# Patient Record
Sex: FEMALE | Race: AMERICAN INDIAN OR ALASKA NATIVE | Employment: UNEMPLOYED | URBAN - METROPOLITAN AREA
[De-identification: names, ages, dates, MRNs, and addresses within clinical notes are randomized per-mention and may not be internally consistent; named-entity substitution may affect disease eponyms.]

---

## 2019-11-01 ENCOUNTER — TRANSFERRED RECORDS (OUTPATIENT)
Dept: HEALTH INFORMATION MANAGEMENT | Facility: CLINIC | Age: 44
End: 2019-11-01

## 2019-11-01 LAB
HEP C HIM: ABNORMAL
TSH SERPL-ACNC: 0.36 UIU/ML (ref 0.49–4.67)

## 2019-11-06 NOTE — PROGRESS NOTES
[facility]  Transfer Triage Note    Date of call: 11/5/2019  Time of call: 4PM    Reason for Transfer: Hepatology consultation  Diagnosis: acute hepatitis in setting of cirrhosis    Outside Records: Unavailable at time of call    Stability of Patient: stable for transfer at time of call    Expected Time of Arrival for Transfer: less than 24 hours    Pt is a 43 y/o female with PMH of liver cirrhosis and EtOH dependence with current active EtOH use (reportedly 1L vodka/day PTA). Pt admitted to [facility] Nov 1st with abdominal pain, found to have AST and ALT in 1000s, Alk phos in the 200s, T bili of 14.5, INR 2.4, normal creatinine. GI at [facility] consulted, concern for acute EtOH hepatitis and Pt started on methylprednisolone with Maddrey score reported at 102. Since admission, LFTs not improving significantly and [facility] requesting transfer to [facility] for further evaluation with hepatology here. Today AST and ALT in 700s, alk phos 191, INR has increased to 3.2 and T bili has increased to 14.5. Creatinine today is 0.7 and Hgb 10.9. MRCP done at [facility] reportedly without obstruction or biliary dilatation seen, reportedly testing for acute infectious hepatitis done at [facility] as well and negative. Dr. Hawkins of hepatology was included in the phone call and recommended transfer to [facility] to evaluate for additional causes of acute hepatitis including consideration of possible liver biopsy pending clinical course due to concerns that this is not wholly consistent with solely acute EtOH hepatitis. Pt stable for transfer at time of my call with SBP in 90s, no AMS with regular lactulose use.     Pt accepted to Adult Med/Surg bed. If greater than 24 hours or if clinical status change, transferring provider was asked to call and provide a physician-to-physician update prior to transfer to ensure level of care remains appropriate.     Antonia Hanks MD  [phone]        Update 11/7 at 2:30 pm   Received an update from the hospitalist at [facility] regarding ms. Sanchez. She has remained hemodynamically stable, with stable vitals and no new concerning clinical changes such as altered mental status. Lab changes notable for increase in bili from 16.5 to 18.8, however LFTs downtrending (now 500s) and INR unchanged at 3. She has a newly positive hep C (negative 10/2018), other hepatitis serologies reportedly normal/negative. GI remains involved.     Shelly Dunbar MD   [phone]       Update 11/10 at 3 pm   Received an update from the hospitalist Dr. Downs at [facility], endorsing that Ms. Sanchez continues to have uptrending bilirubin (23 now), AST/ALT fluctuating in 400-500s and INR stabilized at 3. MELD of 31. They did an MRCP that was negative for obstruction, no new ascites or altered mental status. Dr. Segovia of hepatology on the call, remains concerned that her trend is not clearly all related to this newly positive hepatitis C and a liver biopsy may still be warranted, along with the hepatologist and pathologist expertise of [facility].   Patient accepted to med-surg bed without tele.     Shelly Dunbar MD   [phone]

## 2019-11-10 ENCOUNTER — HOSPITAL ENCOUNTER (INPATIENT)
Facility: CLINIC | Age: 44
LOS: 9 days | Discharge: HOME | DRG: 433 | End: 2019-11-19
Attending: INTERNAL MEDICINE | Admitting: INTERNAL MEDICINE
Payer: COMMERCIAL

## 2019-11-10 DIAGNOSIS — K76.82 HEPATIC ENCEPHALOPATHY (H): ICD-10-CM

## 2019-11-10 DIAGNOSIS — K21.9 GASTROESOPHAGEAL REFLUX DISEASE WITHOUT ESOPHAGITIS: ICD-10-CM

## 2019-11-10 DIAGNOSIS — K75.9 HEPATITIS: ICD-10-CM

## 2019-11-10 DIAGNOSIS — G89.4 CHRONIC PAIN SYNDROME: ICD-10-CM

## 2019-11-10 DIAGNOSIS — K70.31 ALCOHOLIC CIRRHOSIS OF LIVER WITH ASCITES (H): Primary | ICD-10-CM

## 2019-11-10 LAB
ALT SERPL-CCNC: 418 U/L (ref 7–55)
AST SERPL-CCNC: 493 U/L (ref 5–34)
CREAT SERPL-MCNC: 0.7 MG/DL (ref 0.6–1.3)
GLUCOSE SERPL-MCNC: 78 MG/DL (ref 70–99)
INR PPP: 3.1 (ref 0.9–1.2)
POTASSIUM SERPL-SCNC: 3.7 MMOL/L (ref 3.6–5.5)

## 2019-11-10 PROCEDURE — 99207 ZZC NO CHARGE LOS: CPT | Performed by: INTERNAL MEDICINE

## 2019-11-10 PROCEDURE — 25000132 ZZH RX MED GY IP 250 OP 250 PS 637: Performed by: NURSE PRACTITIONER

## 2019-11-10 PROCEDURE — 12000001 ZZH R&B MED SURG/OB UMMC

## 2019-11-10 PROCEDURE — 99207 ZZC APP CREDIT; MD BILLING SHARED VISIT: CPT | Performed by: NURSE PRACTITIONER

## 2019-11-10 PROCEDURE — 25800030 ZZH RX IP 258 OP 636: Performed by: NURSE PRACTITIONER

## 2019-11-10 PROCEDURE — 99222 1ST HOSP IP/OBS MODERATE 55: CPT | Performed by: INTERNAL MEDICINE

## 2019-11-10 RX ORDER — TRAZODONE HYDROCHLORIDE 50 MG/1
50 TABLET, FILM COATED ORAL AT BEDTIME
Status: DISCONTINUED | OUTPATIENT
Start: 2019-11-10 | End: 2019-11-10

## 2019-11-10 RX ORDER — ONDANSETRON 4 MG/1
4 TABLET, ORALLY DISINTEGRATING ORAL EVERY 6 HOURS PRN
Status: DISCONTINUED | OUTPATIENT
Start: 2019-11-10 | End: 2019-11-19 | Stop reason: HOSPADM

## 2019-11-10 RX ORDER — FAMOTIDINE 20 MG/1
40 TABLET, FILM COATED ORAL 2 TIMES DAILY
Status: DISCONTINUED | OUTPATIENT
Start: 2019-11-10 | End: 2019-11-10

## 2019-11-10 RX ORDER — GABAPENTIN 300 MG/1
300 CAPSULE ORAL AT BEDTIME
Status: DISCONTINUED | OUTPATIENT
Start: 2019-11-10 | End: 2019-11-19 | Stop reason: HOSPADM

## 2019-11-10 RX ORDER — PROCHLORPERAZINE 25 MG
25 SUPPOSITORY, RECTAL RECTAL EVERY 12 HOURS PRN
Status: DISCONTINUED | OUTPATIENT
Start: 2019-11-10 | End: 2019-11-19 | Stop reason: HOSPADM

## 2019-11-10 RX ORDER — ONDANSETRON 2 MG/ML
4 INJECTION INTRAMUSCULAR; INTRAVENOUS EVERY 6 HOURS PRN
Status: DISCONTINUED | OUTPATIENT
Start: 2019-11-10 | End: 2019-11-19 | Stop reason: HOSPADM

## 2019-11-10 RX ORDER — NALOXONE HYDROCHLORIDE 0.4 MG/ML
.1-.4 INJECTION, SOLUTION INTRAMUSCULAR; INTRAVENOUS; SUBCUTANEOUS
Status: DISCONTINUED | OUTPATIENT
Start: 2019-11-10 | End: 2019-11-19 | Stop reason: HOSPADM

## 2019-11-10 RX ORDER — GABAPENTIN 300 MG/1
300-600 CAPSULE ORAL 3 TIMES DAILY
Status: ON HOLD | COMMUNITY
End: 2019-11-12

## 2019-11-10 RX ORDER — FUROSEMIDE 40 MG
40 TABLET ORAL DAILY
Status: ON HOLD | COMMUNITY
End: 2019-11-12

## 2019-11-10 RX ORDER — FAMOTIDINE 40 MG/1
40 TABLET, FILM COATED ORAL DAILY
Status: ON HOLD | COMMUNITY
End: 2019-11-12

## 2019-11-10 RX ORDER — LIDOCAINE 40 MG/G
CREAM TOPICAL
Status: DISCONTINUED | OUTPATIENT
Start: 2019-11-10 | End: 2019-11-19 | Stop reason: HOSPADM

## 2019-11-10 RX ORDER — FUROSEMIDE 40 MG
40 TABLET ORAL DAILY
Status: DISCONTINUED | OUTPATIENT
Start: 2019-11-11 | End: 2019-11-19 | Stop reason: HOSPADM

## 2019-11-10 RX ORDER — SPIRONOLACTONE 50 MG/1
50 TABLET, FILM COATED ORAL DAILY
Status: ON HOLD | COMMUNITY
End: 2019-11-12

## 2019-11-10 RX ORDER — HYDROMORPHONE HYDROCHLORIDE 2 MG/1
2-4 TABLET ORAL
Status: DISCONTINUED | OUTPATIENT
Start: 2019-11-10 | End: 2019-11-19 | Stop reason: HOSPADM

## 2019-11-10 RX ORDER — MULTIVITAMIN,THERAPEUTIC
1 TABLET ORAL
Status: ON HOLD | COMMUNITY
End: 2019-11-12

## 2019-11-10 RX ORDER — FAMOTIDINE 20 MG/1
20 TABLET, FILM COATED ORAL 2 TIMES DAILY
Status: DISCONTINUED | OUTPATIENT
Start: 2019-11-11 | End: 2019-11-13

## 2019-11-10 RX ORDER — PROCHLORPERAZINE MALEATE 10 MG
10 TABLET ORAL EVERY 6 HOURS PRN
Status: DISCONTINUED | OUTPATIENT
Start: 2019-11-10 | End: 2019-11-19 | Stop reason: HOSPADM

## 2019-11-10 RX ORDER — MULTIVITAMIN,THERAPEUTIC
1 TABLET ORAL DAILY
Status: DISCONTINUED | OUTPATIENT
Start: 2019-11-11 | End: 2019-11-19 | Stop reason: HOSPADM

## 2019-11-10 RX ORDER — GABAPENTIN 300 MG/1
300 CAPSULE ORAL 3 TIMES DAILY
Status: DISCONTINUED | OUTPATIENT
Start: 2019-11-10 | End: 2019-11-19 | Stop reason: HOSPADM

## 2019-11-10 RX ORDER — AMOXICILLIN 250 MG
2 CAPSULE ORAL 2 TIMES DAILY PRN
Status: DISCONTINUED | OUTPATIENT
Start: 2019-11-10 | End: 2019-11-19 | Stop reason: HOSPADM

## 2019-11-10 RX ORDER — LACTULOSE 10 G/15ML
30 SOLUTION ORAL 2 TIMES DAILY
Status: DISCONTINUED | OUTPATIENT
Start: 2019-11-10 | End: 2019-11-13

## 2019-11-10 RX ORDER — AMOXICILLIN 250 MG
1 CAPSULE ORAL 2 TIMES DAILY PRN
Status: DISCONTINUED | OUTPATIENT
Start: 2019-11-10 | End: 2019-11-19 | Stop reason: HOSPADM

## 2019-11-10 RX ORDER — SPIRONOLACTONE 50 MG/1
50 TABLET, FILM COATED ORAL DAILY
Status: DISCONTINUED | OUTPATIENT
Start: 2019-11-11 | End: 2019-11-19 | Stop reason: HOSPADM

## 2019-11-10 RX ORDER — LACTULOSE 10 G/15ML
30 SOLUTION ORAL
Status: ON HOLD | COMMUNITY
Start: 2019-11-10 | End: 2019-11-11

## 2019-11-10 RX ORDER — NALOXONE HYDROCHLORIDE 0.4 MG/ML
.1-.4 INJECTION, SOLUTION INTRAMUSCULAR; INTRAVENOUS; SUBCUTANEOUS
Status: DISCONTINUED | OUTPATIENT
Start: 2019-11-10 | End: 2019-11-11

## 2019-11-10 RX ADMIN — Medication 1 MG: at 22:19

## 2019-11-10 RX ADMIN — Medication 25 MG: at 22:34

## 2019-11-10 RX ADMIN — HYDROMORPHONE HYDROCHLORIDE 4 MG: 2 TABLET ORAL at 21:26

## 2019-11-10 RX ADMIN — GABAPENTIN 300 MG: 300 CAPSULE ORAL at 22:20

## 2019-11-10 RX ADMIN — SODIUM CHLORIDE 500 ML: 9 INJECTION, SOLUTION INTRAVENOUS at 22:20

## 2019-11-10 RX ADMIN — GABAPENTIN 300 MG: 300 CAPSULE ORAL at 22:19

## 2019-11-10 RX ADMIN — LACTULOSE 30 ML: 20 SOLUTION ORAL at 22:19

## 2019-11-10 ASSESSMENT — ACTIVITIES OF DAILY LIVING (ADL)
DRESS: 0-->INDEPENDENT
TOILETING: 0-->INDEPENDENT
AMBULATION: 0-->INDEPENDENT
FALL_HISTORY_WITHIN_LAST_SIX_MONTHS: NO
TRANSFERRING: 0-->INDEPENDENT
BATHING: 0-->INDEPENDENT
RETIRED_EATING: 0-->INDEPENDENT
SWALLOWING: 0-->SWALLOWS FOODS/LIQUIDS WITHOUT DIFFICULTY
COGNITION: 0 - NO COGNITION ISSUES REPORTED
RETIRED_COMMUNICATION: 0-->UNDERSTANDS/COMMUNICATES WITHOUT DIFFICULTY

## 2019-11-10 ASSESSMENT — MIFFLIN-ST. JEOR: SCORE: 1575.76

## 2019-11-10 NOTE — LETTER
[address] [facility]  [address] [address]  [phone]    2019    Re: Jessie Sanchez  [address]   [place] [id number] [phone] (home)     : 1975      To Whom It May Concern:      Jessie Sanchez was hospitalized from 11/10/2019 until 2019 due to medical illness.  She may return to work when cleared at outpatient follow up in approximately 1-2 weeks      Sincerely,        Viral Hernandez MD

## 2019-11-11 ENCOUNTER — APPOINTMENT (OUTPATIENT)
Dept: ULTRASOUND IMAGING | Facility: CLINIC | Age: 44
DRG: 433 | End: 2019-11-11
Attending: NURSE PRACTITIONER
Payer: COMMERCIAL

## 2019-11-11 LAB
ALBUMIN SERPL-MCNC: 1.8 G/DL (ref 3.4–5)
ALP SERPL-CCNC: 205 U/L (ref 40–150)
ALT SERPL W P-5'-P-CCNC: 440 U/L (ref 0–50)
ANION GAP SERPL CALCULATED.3IONS-SCNC: 8 MMOL/L (ref 3–14)
AST SERPL W P-5'-P-CCNC: 490 U/L (ref 0–45)
BILIRUB SERPL-MCNC: 22.1 MG/DL (ref 0.2–1.3)
BUN SERPL-MCNC: 12 MG/DL (ref 7–30)
CALCIUM SERPL-MCNC: 8.2 MG/DL (ref 8.5–10.1)
CHLORIDE SERPL-SCNC: 96 MMOL/L (ref 94–109)
CO2 SERPL-SCNC: 25 MMOL/L (ref 20–32)
CREAT SERPL-MCNC: 1 MG/DL (ref 0.52–1.04)
ERYTHROCYTE [DISTWIDTH] IN BLOOD BY AUTOMATED COUNT: 22 % (ref 10–15)
GFR SERPL CREATININE-BSD FRML MDRD: 68 ML/MIN/{1.73_M2}
GLUCOSE SERPL-MCNC: 90 MG/DL (ref 70–99)
HCT VFR BLD AUTO: 35.8 % (ref 35–47)
HGB BLD-MCNC: 11.8 G/DL (ref 11.7–15.7)
INR PPP: 2.86 (ref 0.86–1.14)
MCH RBC QN AUTO: 27.1 PG (ref 26.5–33)
MCHC RBC AUTO-ENTMCNC: 33 G/DL (ref 31.5–36.5)
MCV RBC AUTO: 82 FL (ref 78–100)
PLATELET # BLD AUTO: 111 10E9/L (ref 150–450)
POTASSIUM SERPL-SCNC: 3.5 MMOL/L (ref 3.4–5.3)
PROT SERPL-MCNC: 5.4 G/DL (ref 6.8–8.8)
RBC # BLD AUTO: 4.36 10E12/L (ref 3.8–5.2)
SODIUM SERPL-SCNC: 129 MMOL/L (ref 133–144)
WBC # BLD AUTO: 13.6 10E9/L (ref 4–11)

## 2019-11-11 PROCEDURE — 87798 DETECT AGENT NOS DNA AMP: CPT | Performed by: NURSE PRACTITIONER

## 2019-11-11 PROCEDURE — 85027 COMPLETE CBC AUTOMATED: CPT | Performed by: NURSE PRACTITIONER

## 2019-11-11 PROCEDURE — 86803 HEPATITIS C AB TEST: CPT | Performed by: INTERNAL MEDICINE

## 2019-11-11 PROCEDURE — 12000001 ZZH R&B MED SURG/OB UMMC

## 2019-11-11 PROCEDURE — 25000132 ZZH RX MED GY IP 250 OP 250 PS 637: Performed by: NURSE PRACTITIONER

## 2019-11-11 PROCEDURE — 36415 COLL VENOUS BLD VENIPUNCTURE: CPT | Performed by: NURSE PRACTITIONER

## 2019-11-11 PROCEDURE — 36415 COLL VENOUS BLD VENIPUNCTURE: CPT | Performed by: INTERNAL MEDICINE

## 2019-11-11 PROCEDURE — 80053 COMPREHEN METABOLIC PANEL: CPT | Performed by: NURSE PRACTITIONER

## 2019-11-11 PROCEDURE — 76705 ECHO EXAM OF ABDOMEN: CPT

## 2019-11-11 PROCEDURE — 99232 SBSQ HOSP IP/OBS MODERATE 35: CPT | Performed by: INTERNAL MEDICINE

## 2019-11-11 PROCEDURE — 87522 HEPATITIS C REVRS TRNSCRPJ: CPT | Performed by: INTERNAL MEDICINE

## 2019-11-11 PROCEDURE — 85610 PROTHROMBIN TIME: CPT | Performed by: NURSE PRACTITIONER

## 2019-11-11 PROCEDURE — 87902 NFCT AGT GNTYP ALYS HEP C: CPT | Performed by: INTERNAL MEDICINE

## 2019-11-11 PROCEDURE — 25000128 H RX IP 250 OP 636: Performed by: NURSE PRACTITIONER

## 2019-11-11 PROCEDURE — 25000132 ZZH RX MED GY IP 250 OP 250 PS 637: Performed by: PHYSICIAN ASSISTANT

## 2019-11-11 RX ORDER — CALCIUM CARBONATE 500 MG/1
500 TABLET, CHEWABLE ORAL DAILY PRN
Status: DISCONTINUED | OUTPATIENT
Start: 2019-11-11 | End: 2019-11-13

## 2019-11-11 RX ORDER — BISMUTH SUBSALICYLATE 262 MG/1
524 TABLET, CHEWABLE ORAL
Status: DISCONTINUED | OUTPATIENT
Start: 2019-11-11 | End: 2019-11-13

## 2019-11-11 RX ADMIN — LACTULOSE 30 ML: 20 SOLUTION ORAL at 20:29

## 2019-11-11 RX ADMIN — ONDANSETRON 4 MG: 4 TABLET, ORALLY DISINTEGRATING ORAL at 18:57

## 2019-11-11 RX ADMIN — HYDROMORPHONE HYDROCHLORIDE 4 MG: 2 TABLET ORAL at 18:36

## 2019-11-11 RX ADMIN — PEPTIC RELIEF 524 MG: 262 TABLET ORAL at 22:57

## 2019-11-11 RX ADMIN — LACTULOSE 30 ML: 20 SOLUTION ORAL at 08:46

## 2019-11-11 RX ADMIN — HYDROMORPHONE HYDROCHLORIDE 4 MG: 2 TABLET ORAL at 15:32

## 2019-11-11 RX ADMIN — THERA TABS 1 TABLET: TAB at 08:46

## 2019-11-11 RX ADMIN — GABAPENTIN 300 MG: 300 CAPSULE ORAL at 15:30

## 2019-11-11 RX ADMIN — HYDROMORPHONE HYDROCHLORIDE 4 MG: 2 TABLET ORAL at 21:31

## 2019-11-11 RX ADMIN — GABAPENTIN 300 MG: 300 CAPSULE ORAL at 08:48

## 2019-11-11 RX ADMIN — HYDROMORPHONE HYDROCHLORIDE 4 MG: 2 TABLET ORAL at 12:19

## 2019-11-11 RX ADMIN — FAMOTIDINE 20 MG: 20 TABLET ORAL at 20:29

## 2019-11-11 RX ADMIN — FAMOTIDINE 20 MG: 20 TABLET ORAL at 08:45

## 2019-11-11 RX ADMIN — HYDROMORPHONE HYDROCHLORIDE 4 MG: 2 TABLET ORAL at 04:57

## 2019-11-11 RX ADMIN — GABAPENTIN 300 MG: 300 CAPSULE ORAL at 20:29

## 2019-11-11 RX ADMIN — GABAPENTIN 300 MG: 300 CAPSULE ORAL at 21:31

## 2019-11-11 RX ADMIN — Medication 25 MG: at 21:31

## 2019-11-11 RX ADMIN — SPIRONOLACTONE 50 MG: 50 TABLET ORAL at 08:46

## 2019-11-11 RX ADMIN — FUROSEMIDE 40 MG: 40 TABLET ORAL at 08:46

## 2019-11-11 RX ADMIN — HYDROMORPHONE HYDROCHLORIDE 4 MG: 2 TABLET ORAL at 08:45

## 2019-11-11 ASSESSMENT — ACTIVITIES OF DAILY LIVING (ADL)
ADLS_ACUITY_SCORE: 10

## 2019-11-11 NOTE — CONSULTS
HEPATOLOGY CONSULTATION      Date of Admission:  11/10/2019          ASSESSMENT AND RECOMMENDATIONS:     44 year old female with severe alcohol use disorder, polysubstance abuse and known alcoholic cirrhosis, admitted to [facility] on 19 with markedly abnormal liver tests (AST 1890, ALT 1068 Tbili 14.5) initially attributed to acute alcoholic hepatitis (MELD 30, .5 on admission). During hospitalization was diagnosed with new hepatitis C infection (negative HCV Norma 10/2018, positive 2019). Transferred here for higher level of care.    Acute alcoholic hepatitis in alcoholic cirrhosis, severe, with concomitant new HCV infection:  - AST and ALT decreasin and 440  - Total bili going up, 22.1 today, nonresponder to prednisolone. No indication for this at this point.  - Elevated WBC consistent with AAH  - Abdominal U/S w/ dopplers no portal vein thrombosis, no mass  - MRI/MRCP unremarkable  - Ongoing alcohol use documented in chart with positive, high BAL, denied by patient  - Utox positive for amphetamines and opioids on admission  - Unknown timing of new HCV infection. Patient denies most exposures but does admit to inhalation drug use, not forthcoming about last use.    Alcohol use disorder & polysubstance abuse  -Very poor insight and lack of transparency  -Denies use despite multiple positive BAL  -Denies any other drug use including IV drugs. Urine drug screen positive for amphetamines and opiates on 2019 at OSH and she has had multiple urine drug screens positive for amphetamines in the past 8 months.    Recommendations:  Intensive CD treatment will be the cornerstone of any hope for recovery  Given her lack of insight in to her substance use, I am concerned about her chances for success.  Her mortality with underlying cirrhosis, AAH, new HCV is quite high. This was discussed with her and her .   We have no specific therapies to offer inpatient.  I do not see utility in a  liver biopsy.  She can return to her home health care system and pursue outpatient hepatitis C treatment Encourage high protein, low sodium diet (2g Na), there is no need for fluid restriction from hepatology standpoint  Nutrition consult and CD consult if she remains inpatient    Ally Segovia MD    Hepatology/Liver Transplant  Medical Director, Liver Transplantation  [facility]    Appointments [phone]  Clinic Fax [phone]  Transplant Care [phone] Option 4  Transplant Fax [phone]  Administrative Office [phone]  Administrative Fax [phone]  ===================================================================        Scribe disclosure:  I, Papito Melendez, am serving as a scribe to document services personally performed by Dr. Ally Segovia, based on data collection and the provider's statements to me..    Thomas Melendez, MS4  Medical Student  [employer] [employer]  MICU 4C         Chief Complaint:   We were asked by Dr. Harrison of Internal Medicine to evaluate this patient with acute hepatitis on cirrhosis.    History is obtained from the patient and the medical record.          History of Present Illness:   Jessie Sanchez is a 44 year old female with alcoholic cirrhosis c/b portal hypertensive gastropathy, alcohol use disorder c/b seizures and acute pancreatitis, methamphetamine use, and opioid use.    Admitted to [facility] in [place], [place] on 11/1/19 for abdominal pain, nausea, and orange urine found to have acute hepatitis on cirrhosis with AST and ALT 1453 and 1068, alk phos 187, T bili 14.5, INR 2.4 (MELD of 30 on admission).     Started on methylprednisolone for suspected acute alcoholic hepatitis. AST and ALT have mildly improved but T bili has continued to increase as has INR. OSH admission labs were also significant for negative ethanol, positive opiates, positive amphetamines, negative hepatitis A IgM, negative hepatitis B  "antigen, negative hepatitis B core antibody, positive hepatitis C antibody, positive hepatitis C RNA, and negative acetaminophen level.     Patient denies drinking within the past six months, although on 11/1 she told the provider she has been drinking for the past 5 months. Positive BAL as recnetly as 9/2019.     Abdominal U/S showed no ascites noted. Distended gallbladder with 6 mm thickened wall and trace free fluid adjacent to it. MRCP demonstrated \"no evidence for cholelithiasis or choledocholithiasis, marked gallbladder wall thickening which can be seen in the setting of cirrhosis or hepatitis. Hepatic cirrhosis with very large splenorenal shunt.\"     She was transferred to [facility] on 11/10/19 and being seen by hepatology service for acute hepatitis on cirrhosis. This morning she complains of right sided abdominal pain partially relieved by 4 mg dilaudid. Jessie states that 2 weeks ago she experienced coffee ground emesis 1x and several episodes of dark stools but that both of these sx have not recurred since her hospitalization. She endorses continued fatigue, jaundice,  and diffuse abdominal pain. She denies nausea, vomiting, hematochezia, or recurrent melena.            Past Medical History:   Reviewed and edited as appropriate  Past Medical History:   Diagnosis Date     Cirrhosis (H)      Polysubstance abuse (H)     Meth, alcohol            Past Surgical History:   Reviewed and edited as appropriate   Past Surgical History:   Procedure Laterality Date     SALPINGO-OOPHORECTOMY BILATERAL              Previous Endoscopy:   EGD (12/2018):  Findings:  Esophagus: Moderate esophagitis above a small hiatal hernia. No   evidence of esophageal varices.   Stomach: Moderate portal hypertensive gastropathy. Mild   gastritis. No evidence of gastric varices or ulcers  Duodenum: Mild patchy duodenitis at bulb. Normal distal duodenum         Social History:   Patient is recently  with a grandchild on the way. She lives " in [place], [place] and [place]. He has 5 children. Youngest is 16 and lives with grandparents.    EtOH - patient states that she was sober for 8 months prior to a relapse of heavy drinking for 2 weeks which occurred 2 months ago. She states that she then abstained until having one drink two weeks ago. Of note, OSH documentation states that the patient admitted to drinking for the past 5 months upon admission. Patient had multiple elevated blood alcohol levels in the past year including a level of 0.381 on 9/5/2019.    Other Drug Use - Patient denies any other drug use including IV drugs. However, chart review shows a hx of methamphetamine and opioid use disorders. Urine drug screen was positive for amphetamines and opiates on 11/2/2019 at OSH and she has had multiple urine drug screens positive for amphetamines in the past 8 months.    Sexual Hx - deferred         Family History:   Reviewed and edited as appropriate  No known history of gastrointestinal/liver disease or  gastrointestinal malignancies       Allergies:   Reviewed and edited as appropriate     Allergies   Allergen Reactions     Codeine Rash            Medications:     Current Facility-Administered Medications   Medication     famotidine (PEPCID) tablet 20 mg     furosemide (LASIX) tablet 40 mg     gabapentin (NEURONTIN) capsule 300 mg     gabapentin (NEURONTIN) capsule 300 mg     HYDROmorphone (DILAUDID) tablet 2-4 mg     lactulose (CHRONULAC) solution 30 mL     lidocaine (LMX4) cream     lidocaine 1 % 0.1-1 mL     magnesium hydroxide (MILK OF MAGNESIA) suspension 30 mL     melatonin tablet 1 mg     multivitamin, therapeutic (THERA-VIT) tablet 1 tablet     naloxone (NARCAN) injection 0.1-0.4 mg     naloxone (NARCAN) injection 0.1-0.4 mg     ondansetron (ZOFRAN-ODT) ODT tab 4 mg    Or     ondansetron (ZOFRAN) injection 4 mg     prochlorperazine (COMPAZINE) injection 10 mg    Or     prochlorperazine  "(COMPAZINE) tablet 10 mg    Or     prochlorperazine (COMPAZINE) Suppository 25 mg     senna-docusate (SENOKOT-S/PERICOLACE) 8.6-50 MG per tablet 1 tablet    Or     senna-docusate (SENOKOT-S/PERICOLACE) 8.6-50 MG per tablet 2 tablet     sodium chloride (PF) 0.9% PF flush 3 mL     sodium chloride (PF) 0.9% PF flush 3 mL     spironolactone (ALDACTONE) tablet 50 mg     traZODone (DESYREL) half-tab 25 mg             Review of Systems:     A complete review of systems was performed and is negative except as noted in the HPI           Physical Exam:   /63 (BP Location: Right arm)   Pulse 95   Temp 95.5  F (35.3  C) (Oral)   Resp 18   Ht 1.727 m (5' 8\")   Wt 87.7 kg (193 lb 6.4 oz)   SpO2 96%   BMI 29.41 kg/m    Wt:   Wt Readings from Last 2 Encounters:   11/10/19 87.7 kg (193 lb 6.4 oz)      Constitutional: cooperative, pleasant, not dyspneic/diaphoretic, no acute distress  Eyes: Sclera icteric, PERRL  Ears/nose/mouth/throat: Normal oropharynx without ulcers or exudate, mucus membranes moist, hearing intact  Neck: supple  CV: No edema, RRR, no MRG  Respiratory: Unlabored breathing, CTAB  Abdomen: Nondistended, +bs, no hepatosplenomegaly, moderate TTP in the RUQ, otherwise nontender, no peritoneal signs  Back: no CVA tenderness  Skin: warm, perfused, jaundiced  Neuro: AAO x 3, No asterixis, Strength 5/5 throughout, CN II-XII intact  Psych: Normal affect  MSK: Normal gait         Data:   Labs and imaging below were independently reviewed and interpreted    MELD-Na score: 33 at 11/11/2019  5:56 AM  MELD score: 30 at 11/11/2019  5:56 AM  Calculated from:  Serum Creatinine: 1.00 mg/dL at 11/11/2019  5:56 AM  Serum Sodium: 129 mmol/L at 11/11/2019  5:56 AM  Total Bilirubin: 22.1 mg/dL at 11/11/2019  5:56 AM  INR(ratio): 2.86 at 11/11/2019  5:56 AM  Age: 44 years     BMP  Recent Labs   Lab 11/11/19  0556   *   POTASSIUM 3.5   CHLORIDE 96   KEESHA 8.2*   CO2 25   BUN 12   CR 1.00   GLC 90     CBC  Recent Labs   Lab " 11/11/19  0556   WBC 13.6*   RBC 4.36   HGB 11.8   HCT 35.8   MCV 82   MCH 27.1   MCHC 33.0   RDW 22.0*   *     INR  Recent Labs   Lab 11/11/19  0556   INR 2.86*     LFTs  Recent Labs   Lab 11/11/19  0556   ALKPHOS 205*   *   *   BILITOTAL 22.1*   PROTTOTAL 5.4*   ALBUMIN 1.8*      PANCNo lab results found in last 7 days.    Imaging:    US Abdomen w/ doppler (11/11/19):  - No sonographic evidence of portal venous thrombus as questioned.  - Cirrhotic appearing liver with sequelae of portal hypertension with mild ascites and reversal of flow in all of the portal veins.  - No focal hepatic mass    MRI/MRCP (11/1/19):  FINDINGS: No evidence for choledocholithiasis or cholelithiasis. No intrahepatic  or extrahepatic biliary dilation. There is marked gallbladder wall thickening.  Within the limits of noncontrast technique, no suspicious hepatic lesion.    There are changes of hepatic cirrhosis with prominent portosystemic collateral  formation. Spleen normal in size. There is a large splenorenal shunt in the left  upper quadrant. No hydronephrosis. There are a few cysts noted in the right  kidney. No evidence for bowel obstruction. Abdominal aorta normal in caliber.    IMPRESSION:  1.  No evidence for cholelithiasis or choledocholithiasis.  2.  Marked gallbladder wall thickening which can be seen in the setting of  cirrhosis or hepatitis. CHF also a consideration please correlate clinically.  3.  Hepatic cirrhosis with very large splenorenal shunt.    US Abdomen w/o doppler (11/1/19):    FINDINGS: No ascites noted. However incidental visualization of distended gallbladder with 6 mm thickened wall and trace free fluid adjacent to it. Common hepatic duct is normal in the common bile duct measures 7 mm minimally overdistended but no obvious filling defect within it.    IMPRESSION:   1.  Findings suggest the possibility of acute acalculous cholecystitis. Minimal ductal dilatation.

## 2019-11-11 NOTE — H&P
[facility], [place]    History and Physical - Hospitalist Service, Gold Night       Date of Admission:  11/10/2019    Assessment & Plan   Jessie Sanchez is a 44 year old female admitted on 11/10/2019. She has a history of alcohol abuse, cirrhosis and diastolic heart failure and transferred to our facility from an outside hospital where she has been admitted for hepatitis from 11/1/2019    1) Acute hepatitis, history of cirrhosis - Presented to outside hospital on 11/1 with AST and ALT of 1453 and 1068, alk phos of 187, T bili of 14.5, INR 2.4.  Started on methylprednisolone for suspected alcohol hepatitis.  AST and ALT have mildly improved but T bili has continued to increase as his INR.  No renal involvement yet.  MELD 31.  11/3 MRCP significant for cirrhosis.  Labs significant for negative ethanol on admission, negative hepatitis A IgM, negative hepatitis B antigen, negative hepatitis B core antibody, positive hepatitis C RNA, negative acetaminophen level on admission.  Of note, patient denies drinking within the past six months, although on 11/1 she told the provider she has been drinking for the past 5 months.  - We will obtain CMP, CBC, INR.  Work up so far also missing US w/ dopplers and EBV screen.  Will order  - Consult hepatology  - Continue lactulose  - Continue lasix, spironolactone    2) History of alcohol abuse - Patient denies significant alcohol abuse over the past six months.  - Folic acid, MVI, thiamine     3) Hyponatremia - Sodium 127 today down from low 130s in the past few days.  Likely secondary to cirrhosis, but chloride also low so somewhat consistent with hypovolemia.  - Will give 500 ccs NS now, CMP in am.  Consider fluid restricting if worsening.       Diet: Regular diet, NPO midnight  DVT Prophylaxis: Pneumatic Compression Devices  Irby Catheter: not present  Code Status: Full    Disposition Plan   Expected discharge: > 7 days, recommended to prior living  arrangement once diagnosis established.  Entered: KRISTIN Obrien CNP 11/10/2019, 8:18 PM     The patient's care was discussed with the Attending Physician, Dr. Harrison.    KRISTIN Obrien CNP  [facility], [place]  Pager: [phone]  Please see sticky note for cross cover information  ______________________________________________________________________    Chief Complaint   Transferred for liver failure    History is obtained from the patient    History of Present Illness   Jessie Sanchez is a 44 year old female admitted on 11/10/2019. She has a history of alcohol abuse, cirrhosis and diastolic heart failure and transferred to our facility from an outside hospital where she has been admitted for hepatitis from 11/1/2019.    The patient reports that she has been sober for 6months, although on admission on 11/1 she told providers she had been drinking for the past 5 months..  On 10/29 she noted fatigue.  Shortly after she and her  noticed jaundice.  On 11/1 she presented to an outside facility where she was admitted for acute hepatitis.  There, she was started on steroids for presumed alcohol hepatitis    Prior history significant for cirrhosis complicated by GI bleeding.  She believes her last EGD was about 9 months ago and that she had a bleeding issue that had to be treated surgically.    Review of Systems    The 10 point Review of Systems is negative other than noted in the HPI or here.     Past Medical History    I have reviewed this patient's medical history and updated it with pertinent information if needed.   Past Medical History:   Diagnosis Date     Cirrhosis (H)      Polysubstance abuse (H)     Meth, alcohol       Past Surgical History   I have reviewed this patient's surgical history and updated it with pertinent information if needed.  Past Surgical History:   Procedure Laterality Date     SALPINGO-OOPHORECTOMY BILATERAL         Social History   I have  reviewed this patient's social history and updated it with pertinent information if needed.  Social History     Tobacco Use     Smoking status: Current Every Day Smoker     Packs/day: 0.00   Substance Use Topics     Alcohol use: Not Currently     Drug use: Yes     Comment: Meth       Family History   I have reviewed this patient's family history and updated it with pertinent information if needed.   Family History   Problem Relation Age of Onset     Anxiety Disorder Mother      Prior to Admission Medications   Prior to Admission Medications   Prescriptions Last Dose Informant Patient Reported? Taking?   famotidine (PEPCID) 40 MG tablet 11/10/2019 at Unknown time  Yes Yes   Sig: Take 40 mg by mouth daily   furosemide (LASIX) 40 MG tablet 11/10/2019 at Unknown time  Yes Yes   Sig: Take 40 mg by mouth daily   gabapentin (NEURONTIN) 300 MG capsule 11/10/2019 at Unknown time  Yes Yes   Sig: Take 300 mg by mouth 4 times daily 1 IN THE AM 1 IN THE AFTERNOON 2 AT NIGHT   spironolactone (ALDACTONE) 50 MG tablet 11/10/2019 at Unknown time  Yes Yes   Sig: Take 50 mg by mouth daily      Facility-Administered Medications: None     Allergies   Allergies   Allergen Reactions     Codeine Rash       Physical Exam   Vital Signs: Temp: 97.4  F (36.3  C) Temp src: Oral   Pulse: 97   Resp: 16 SpO2: 98 % O2 Device: None (Room air)    Weight: 193 lbs 6.4 oz    Physical Exam   Constitutional:   Chronically ill appearing, resting comfortably   Head: Normocephalic and atraumatic.   Eyes: Scleral icterus. Pupils are equal, round, and reactive to light.  Pharynx has no erythema or exudate, mucous membranes are moist  Neck:   No adenopathy, no bony tenderness  Cardiovascular: Regular rate and rhythm without murmurs or gallops  Pulmonary/Chest: Clear to auscultation bilaterally, with no wheezes or retractions. No respiratory distress.  GI: Soft with good bowel sounds.  Generally tender, mildly-distended, with no guarding, no rebound, no  peritoneal signs.   Back:  No bony or CVA tenderness   Musculoskeletal:  No edema or clubbing   Skin: Skin is warm and dry. No rash noted.   Neurological: Alert and oriented to person, place, and time. Nonfocal exam  Psychiatric:  Normal mood and affect.      Data   Data reviewed today: I reviewed all medications, new labs and imaging results over the last 24 hours. I personally reviewed labs and notes from her hospitalization.

## 2019-11-11 NOTE — PROGRESS NOTES
General acute hospital    Medicine Progress Note - Hospitalist Service, Gold 8       Date of Admission:  11/10/2019  Assessment & Plan   Jessie Sanchez is a 44 year old female admitted on 11/10/2019. She has a history of alcohol abuse, cirrhosis and diastolic heart failure and transferred to our facility from an outside hospital where she has been admitted for hepatitis from 11/1/2019    1) Acute viral hepatitis, history of cirrhosis - transferred to our service secondary to failure to improve. Appears she was taking steroids for acute hepatitis with increased lille score. Will hold steroids a this time. Hep C RNA showed 3.8 million viral count. Pending ABDIFATAH. Genotype for treatment options.   - Consult hepatology  - Continue lactulose  - Continue lasix, spironolactone    2) History of alcohol abuse - Patient denies significant alcohol abuse over the past six months.  - Folic acid, MVI, thiamine   -- minimal concern for withdrawal   - Last drink 8 months ago      3) Hyponatremia - Sodium 127 today down from low 130s in the past few days.  Likely secondary to cirrhosis, but chloride also low so somewhat consistent with hypovolemia.  - improved  With fluids. Expect some degree of hyponatremia with cirrhosis       Diet: Combination Diet Regular Diet Adult  Room Service    DVT Prophylaxis: low platelet count. SCDs  Irby Catheter: not present  Code Status: Full Code      Disposition Plan   Expected discharge: 4 - 7 days, recommended to prior living arrangement once adequate pain management/ tolerating PO medications.  Entered: Viral Hernandez MD 11/11/2019, 7:58 AM       The patient's care was discussed with the Patient and Hepatology Consultant.    Viral Hernandez MD  Hospitalist Service, [address]  Pager: [phone]  Please see sticky note for cross cover information  ______________________________________________________________________    Interval  History   Positive ab pain  No SOB  NO fever or chills  Mild abdominal distention      Data reviewed today: I reviewed all medications, new labs and imaging results over the last 24 hours. I personally reviewed no images or EKG's today.    Physical Exam   Vital Signs: Temp: 95.5  F (35.3  C) Temp src: Oral BP: 119/63 Pulse: 95   Resp: 18 SpO2: 96 % O2 Device: None (Room air)    Weight: 193 lbs 6.4 oz  Physical Exam  Constitutional:       Appearance: Normal appearance.   HENT:      Head: Normocephalic and atraumatic.   Eyes:      General: Scleral icterus present.      Pupils: Pupils are equal, round, and reactive to light.   Cardiovascular:      Rate and Rhythm: Normal rate and regular rhythm.      Heart sounds: No murmur. No friction rub. No gallop.    Pulmonary:      Effort: Pulmonary effort is normal. No respiratory distress.      Breath sounds: No wheezing, rhonchi or rales.   Abdominal:      General: There is distension.      Palpations: Abdomen is soft.      Tenderness: There is no tenderness. There is no guarding.   Skin:     Coloration: Skin is jaundiced.   Neurological:      General: No focal deficit present.      Mental Status: She is alert and oriented to person, place, and time.      Cranial Nerves: No cranial nerve deficit.      Motor: No weakness.      Gait: Gait normal.      Comments: No asterixis           Data   Recent Labs   Lab 11/11/19  0556   WBC 13.6*   HGB 11.8   MCV 82   *   INR 2.86*   *   POTASSIUM 3.5   CHLORIDE 96   CO2 25   BUN 12   CR 1.00   ANIONGAP 8   KEESHA 8.2*   GLC 90   ALBUMIN 1.8*   PROTTOTAL 5.4*   BILITOTAL 22.1*   ALKPHOS 205*   *   *

## 2019-11-11 NOTE — PROGRESS NOTES
"SPIRITUAL HEALTH SERVICES  [facility] ([place]) 5B  ON-CALL VISIT     REFERRAL SOURCE: I did visit this afternoon patient Jessie per Epic consult order. I introduced myself as the on-call  and gave all the info about the SHS.      Pt was in her room sleeping. I tried to wake her up from her sleep for a visit but pt said, \"I am not the one who requested a , it is my . My  is not here for now, maybe he left home or he is some wehre in the building. I don't have any idea where he is right now. As you can see me, I am so tired and sleepy and you can talk to him when he get back, or I will let him know that you stop by to see me.\" I informed my visit attempts to the pt nurse and he appreciated my presence for the pt. At this moment pt didn't have a  request except her . When the pt  back, he will be informed by the pt and the pt nurse.     PLAN: The on-call  will be remain open to provide spiritual care for the pt as needed.     Sharmaine Arcos M.Div. ([place]), M.Th., D.Min., [facility]  Staff   Pager [phone]    "

## 2019-11-11 NOTE — PHARMACY-ADMISSION MEDICATION HISTORY
Admission medication history interview status for the 11/10/2019 admission is complete. See Epic admission navigator for allergy information, pharmacy, prior to admission medications and immunization status.     Medication history interview sources:  the patient, chart review, care everywhere review    Changes made to PTA medication list (reason)  Added: NA  Deleted: lactulose  Changed: NA    Additional medication history information (including reliability of information, actions taken by pharmacist):  Patient appeared to be a reliable historian. States that she is not taking lactulose and only received it while on admission in the hospital. Removed from PTA.     Prior to Admission medications    Medication Sig Last Dose Taking? Auth Provider   famotidine (PEPCID) 40 MG tablet Take 40 mg by mouth daily 11/10/2019 at Unknown time Yes Reported, Patient   furosemide (LASIX) 40 MG tablet Take 40 mg by mouth daily 11/10/2019 at Unknown time Yes Reported, Patient   gabapentin (NEURONTIN) 300 MG capsule Take 300-600 mg by mouth 3 times daily :300 mg by mouth in the morning, 300 mg by mouth in the afternoon, 600 mg by mouth in the evening 11/10/2019 at Unknown time Yes Reported, Patient   multivitamin, therapeutic (THERA-VIT) TABS tablet Take 1 tablet by mouth 11/10/2019 at Unknown time Yes Reported, Patient   spironolactone (ALDACTONE) 50 MG tablet Take 50 mg by mouth daily 11/10/2019 at Unknown time Yes Reported, Patient     Medication history completed by: Janett Kaiser: PharmD student class of 2020

## 2019-11-11 NOTE — PROGRESS NOTES
All vitals are stable. Up independently. Void x2. No BM. A&Ox4, taking lactulose and other meds without difficulty. Appetite is good. Abdomen distended and painful, dilaudid 4mg PO ever 3 hours with some relief. Abdominal ultrasound performed. Jaundiced, total bilirubin 22.1.   in room and attentive. Continue with cares and monitor for any changes.

## 2019-11-11 NOTE — PLAN OF CARE
Pt AOx4, VSS on RA. C/o R sided abdominal pain, given 4mg dilaudid w/some relief. Pt slept through most of the night. Finished 500mL bolus @ beginning of shift. Regular diet, tolerating well. Skin jaundiced. Up ad ray. RPIV, S.L. Able to use call light appropriately & make needs known. Continue to monitor & follow POC.

## 2019-11-11 NOTE — PLAN OF CARE
Pt admitted to  from OSH at 2000 for liver transplant workup. Pt A&O, VSS on RA. Pleasant.  at bedside. C/o pain in R abd radiating to R flank and across abd. 4mg oral dilaudid prn. 500 mL NS bolus given tonight. Regular diet, tolerating well. States she has roughly 3 BMs/day on TID lactulose. Good UO. No skin issues except jaundice. Plan to start workup tomorrow.

## 2019-11-12 LAB
ALBUMIN SERPL-MCNC: 1.7 G/DL (ref 3.4–5)
ALP SERPL-CCNC: 197 U/L (ref 40–150)
ALT SERPL W P-5'-P-CCNC: 394 U/L (ref 0–50)
ANION GAP SERPL CALCULATED.3IONS-SCNC: 7 MMOL/L (ref 3–14)
AST SERPL W P-5'-P-CCNC: 431 U/L (ref 0–45)
BASOPHILS # BLD AUTO: 0.1 10E9/L (ref 0–0.2)
BASOPHILS NFR BLD AUTO: 0.4 %
BILIRUB SERPL-MCNC: 23.4 MG/DL (ref 0.2–1.3)
BUN SERPL-MCNC: 19 MG/DL (ref 7–30)
CALCIUM SERPL-MCNC: 8.6 MG/DL (ref 8.5–10.1)
CHLORIDE SERPL-SCNC: 92 MMOL/L (ref 94–109)
CO2 SERPL-SCNC: 27 MMOL/L (ref 20–32)
CREAT SERPL-MCNC: 1 MG/DL (ref 0.52–1.04)
DIFFERENTIAL METHOD BLD: ABNORMAL
EOSINOPHIL # BLD AUTO: 0.5 10E9/L (ref 0–0.7)
EOSINOPHIL NFR BLD AUTO: 3.4 %
ERYTHROCYTE [DISTWIDTH] IN BLOOD BY AUTOMATED COUNT: 22.5 % (ref 10–15)
GFR SERPL CREATININE-BSD FRML MDRD: 69 ML/MIN/{1.73_M2}
GLUCOSE SERPL-MCNC: 105 MG/DL (ref 70–99)
HCT VFR BLD AUTO: 33.2 % (ref 35–47)
HGB BLD-MCNC: 11.2 G/DL (ref 11.7–15.7)
IMM GRANULOCYTES # BLD: 0.7 10E9/L (ref 0–0.4)
IMM GRANULOCYTES NFR BLD: 4.5 %
INR PPP: 2.79 (ref 0.86–1.14)
LACTATE BLD-SCNC: 1.8 MMOL/L (ref 0.7–2)
LYMPHOCYTES # BLD AUTO: 1.8 10E9/L (ref 0.8–5.3)
LYMPHOCYTES NFR BLD AUTO: 12.3 %
MCH RBC QN AUTO: 27.7 PG (ref 26.5–33)
MCHC RBC AUTO-ENTMCNC: 33.7 G/DL (ref 31.5–36.5)
MCV RBC AUTO: 82 FL (ref 78–100)
MONOCYTES # BLD AUTO: 1.3 10E9/L (ref 0–1.3)
MONOCYTES NFR BLD AUTO: 8.7 %
NEUTROPHILS # BLD AUTO: 10.3 10E9/L (ref 1.6–8.3)
NEUTROPHILS NFR BLD AUTO: 70.7 %
NRBC # BLD AUTO: 0 10*3/UL
NRBC BLD AUTO-RTO: 0 /100
PLATELET # BLD AUTO: 125 10E9/L (ref 150–450)
POTASSIUM SERPL-SCNC: 3.9 MMOL/L (ref 3.4–5.3)
PROT SERPL-MCNC: 5.3 G/DL (ref 6.8–8.8)
RBC # BLD AUTO: 4.04 10E12/L (ref 3.8–5.2)
SODIUM SERPL-SCNC: 127 MMOL/L (ref 133–144)
WBC # BLD AUTO: 14.6 10E9/L (ref 4–11)

## 2019-11-12 PROCEDURE — 25000132 ZZH RX MED GY IP 250 OP 250 PS 637: Performed by: NURSE PRACTITIONER

## 2019-11-12 PROCEDURE — 12000001 ZZH R&B MED SURG/OB UMMC

## 2019-11-12 PROCEDURE — 25000128 H RX IP 250 OP 636: Performed by: NURSE PRACTITIONER

## 2019-11-12 PROCEDURE — 83605 ASSAY OF LACTIC ACID: CPT

## 2019-11-12 PROCEDURE — 36415 COLL VENOUS BLD VENIPUNCTURE: CPT | Performed by: STUDENT IN AN ORGANIZED HEALTH CARE EDUCATION/TRAINING PROGRAM

## 2019-11-12 PROCEDURE — 80053 COMPREHEN METABOLIC PANEL: CPT | Performed by: STUDENT IN AN ORGANIZED HEALTH CARE EDUCATION/TRAINING PROGRAM

## 2019-11-12 PROCEDURE — 87522 HEPATITIS C REVRS TRNSCRPJ: CPT | Performed by: INTERNAL MEDICINE

## 2019-11-12 PROCEDURE — 99232 SBSQ HOSP IP/OBS MODERATE 35: CPT | Performed by: STUDENT IN AN ORGANIZED HEALTH CARE EDUCATION/TRAINING PROGRAM

## 2019-11-12 PROCEDURE — 83605 ASSAY OF LACTIC ACID: CPT | Performed by: INTERNAL MEDICINE

## 2019-11-12 PROCEDURE — 85610 PROTHROMBIN TIME: CPT | Performed by: STUDENT IN AN ORGANIZED HEALTH CARE EDUCATION/TRAINING PROGRAM

## 2019-11-12 PROCEDURE — 99207 ZZC NO CHARGE LOS: CPT | Performed by: STUDENT IN AN ORGANIZED HEALTH CARE EDUCATION/TRAINING PROGRAM

## 2019-11-12 PROCEDURE — 85025 COMPLETE CBC W/AUTO DIFF WBC: CPT | Performed by: STUDENT IN AN ORGANIZED HEALTH CARE EDUCATION/TRAINING PROGRAM

## 2019-11-12 PROCEDURE — 25000132 ZZH RX MED GY IP 250 OP 250 PS 637: Performed by: HOSPITALIST

## 2019-11-12 PROCEDURE — 36415 COLL VENOUS BLD VENIPUNCTURE: CPT | Performed by: INTERNAL MEDICINE

## 2019-11-12 PROCEDURE — 25000132 ZZH RX MED GY IP 250 OP 250 PS 637: Performed by: PHYSICIAN ASSISTANT

## 2019-11-12 RX ORDER — FAMOTIDINE 40 MG/1
40 TABLET, FILM COATED ORAL DAILY
Qty: 30 TABLET | Refills: 1 | Status: SHIPPED | OUTPATIENT
Start: 2019-11-12 | End: 2019-11-18

## 2019-11-12 RX ORDER — FUROSEMIDE 40 MG
40 TABLET ORAL DAILY
Qty: 30 TABLET | Refills: 1 | Status: SHIPPED | OUTPATIENT
Start: 2019-11-12 | End: 2019-11-18

## 2019-11-12 RX ORDER — SPIRONOLACTONE 50 MG/1
50 TABLET, FILM COATED ORAL DAILY
Qty: 30 TABLET | Refills: 1 | Status: SHIPPED | OUTPATIENT
Start: 2019-11-12 | End: 2019-11-18

## 2019-11-12 RX ORDER — MULTIVITAMIN,THERAPEUTIC
1 TABLET ORAL DAILY
Qty: 30 TABLET | Refills: 1 | Status: SHIPPED | OUTPATIENT
Start: 2019-11-12

## 2019-11-12 RX ORDER — LACTULOSE 10 G/15ML
30 SOLUTION ORAL 2 TIMES DAILY
Qty: 946 ML | Refills: 1 | Status: SHIPPED | OUTPATIENT
Start: 2019-11-12

## 2019-11-12 RX ORDER — GABAPENTIN 300 MG/1
300-600 CAPSULE ORAL 3 TIMES DAILY
Qty: 180 CAPSULE | Refills: 1 | Status: SHIPPED | OUTPATIENT
Start: 2019-11-12 | End: 2019-11-18

## 2019-11-12 RX ORDER — HYDROMORPHONE HYDROCHLORIDE 2 MG/1
2 TABLET ORAL
Qty: 15 TABLET | Refills: 0 | Status: ON HOLD | OUTPATIENT
Start: 2019-11-12 | End: 2019-12-02

## 2019-11-12 RX ADMIN — HYDROMORPHONE HYDROCHLORIDE 4 MG: 2 TABLET ORAL at 11:49

## 2019-11-12 RX ADMIN — PEPTIC RELIEF 524 MG: 262 TABLET ORAL at 16:26

## 2019-11-12 RX ADMIN — HYDROMORPHONE HYDROCHLORIDE 4 MG: 2 TABLET ORAL at 19:14

## 2019-11-12 RX ADMIN — CALCIUM CARBONATE (ANTACID) CHEW TAB 500 MG 500 MG: 500 CHEW TAB at 11:50

## 2019-11-12 RX ADMIN — PEPTIC RELIEF 524 MG: 262 TABLET ORAL at 07:37

## 2019-11-12 RX ADMIN — LACTULOSE 30 ML: 20 SOLUTION ORAL at 07:37

## 2019-11-12 RX ADMIN — Medication 25 MG: at 01:09

## 2019-11-12 RX ADMIN — FAMOTIDINE 20 MG: 20 TABLET ORAL at 20:36

## 2019-11-12 RX ADMIN — PEPTIC RELIEF 524 MG: 262 TABLET ORAL at 20:36

## 2019-11-12 RX ADMIN — GABAPENTIN 300 MG: 300 CAPSULE ORAL at 07:38

## 2019-11-12 RX ADMIN — GABAPENTIN 300 MG: 300 CAPSULE ORAL at 20:35

## 2019-11-12 RX ADMIN — FAMOTIDINE 20 MG: 20 TABLET ORAL at 07:38

## 2019-11-12 RX ADMIN — SPIRONOLACTONE 50 MG: 50 TABLET ORAL at 07:38

## 2019-11-12 RX ADMIN — THERA TABS 1 TABLET: TAB at 07:37

## 2019-11-12 RX ADMIN — ONDANSETRON 4 MG: 2 INJECTION INTRAMUSCULAR; INTRAVENOUS at 18:03

## 2019-11-12 RX ADMIN — PEPTIC RELIEF 524 MG: 262 TABLET ORAL at 11:50

## 2019-11-12 RX ADMIN — Medication 25 MG: at 20:36

## 2019-11-12 RX ADMIN — GABAPENTIN 300 MG: 300 CAPSULE ORAL at 23:21

## 2019-11-12 RX ADMIN — FUROSEMIDE 40 MG: 40 TABLET ORAL at 07:38

## 2019-11-12 RX ADMIN — HYDROMORPHONE HYDROCHLORIDE 4 MG: 2 TABLET ORAL at 07:37

## 2019-11-12 RX ADMIN — LACTULOSE 30 ML: 20 SOLUTION ORAL at 20:39

## 2019-11-12 RX ADMIN — HYDROMORPHONE HYDROCHLORIDE 4 MG: 2 TABLET ORAL at 15:02

## 2019-11-12 RX ADMIN — HYDROMORPHONE HYDROCHLORIDE 4 MG: 2 TABLET ORAL at 01:09

## 2019-11-12 RX ADMIN — CALCIUM CARBONATE (ANTACID) CHEW TAB 500 MG 500 MG: 500 CHEW TAB at 03:38

## 2019-11-12 RX ADMIN — GABAPENTIN 300 MG: 300 CAPSULE ORAL at 15:02

## 2019-11-12 ASSESSMENT — ACTIVITIES OF DAILY LIVING (ADL)
ADLS_ACUITY_SCORE: 10

## 2019-11-12 ASSESSMENT — MIFFLIN-ST. JEOR: SCORE: 1606.15

## 2019-11-12 ASSESSMENT — PAIN DESCRIPTION - DESCRIPTORS: DESCRIPTORS: ACHING

## 2019-11-12 NOTE — PROGRESS NOTES
Vitals stable. Up independently, eating well. Abdominal pain persists, received dilaudid 4mg PO x2 with relief. Plan for discharge tomorrow or next day.

## 2019-11-12 NOTE — PLAN OF CARE
Pt AOx4, VSS on RA. Triggered sepsis this AM, lactic came back @ 1.8. C/o abdominal pain- given dilaudid x1. Also c/o of heartburn, given tums x1. Rested throughout most of night. Denies nausea. No BM this shift. No other acute changes overnight. Continue to monitor & follow POC.

## 2019-11-12 NOTE — PROGRESS NOTES
[facility]    Medicine Progress Note - Hospitalist Service, Gold 8       Date of Admission:  11/10/2019  Assessment & Plan   Jessie Sanchez is a 44 year old female admitted on 11/10/2019. She has a history of alcohol abuse, cirrhosis and diastolic heart failure and transferred to our facility from an outside hospital where she has been admitted for hepatitis from 11/1/2019    TODAY  - Continue current cares  - Trend labs  - TTE to r/o heart failure  - Work with CC/SW to help patient with ride back to [facility]    # New HCV infection  # Alcoholic hepatitis  # Hx of alcoholic cirrhosis  - Was treated with steroids which has been stopped due to increased lille score. Hep C RNA showed 3.8 million viral count. US without clot.   Hepatology was consulted and recommended intensive outpatient chem dep treatment for alcohol cessation (last drink 2 months ago) and outpatient hepatology follow-up to start HCV treatment.   - Continue lactulose  - Continue lasix, spironolactone  -- Inpatient chem dep consult was placed but unable to see patient.  SW gave patient some resources for outpatient chem dep.   - Folic acid, MVI, thiamine    # Hyponatremia - Sodium 127 today down from low 130s in the past few days.  Likely secondary to cirrhosis.  Monitor       Diet: Combination Diet Regular Diet Adult  Room Service    DVT Prophylaxis: low platelet count. SCDs  Irby Catheter: not present  Code Status: Full Code      Disposition Plan   Expected discharge: 1-2 days, recommended to prior living arrangement once adequate pain management/ tolerating PO medications.  Entered: Jasmin Cat MD 11/12/2019, 3:48 PM       The patient's care was discussed with the Patient and Hepatology Consultant.    Jasmin Cat MD  Hospitalist Service, Gold 8  [facility]  Pager: [phone]  Please see sticky note for cross cover  information  ______________________________________________________________________    Interval History   Mild abdominal distention.  Otherwise, feeling ok.  Very anxious about discharging soon.  Fixating on getting better in the hospital, despite hepatology not having any therapeutic option available in the hospital.     Data reviewed today: I reviewed all medications, new labs and imaging results over the last 24 hours. I personally reviewed no images or EKG's today.    Physical Exam   Vital Signs: Temp: 96.5  F (35.8  C) Temp src: Oral BP: 120/46 Pulse: 107   Resp: 16 SpO2: 95 % O2 Device: None (Room air)    Weight: 193 lbs 6.4 oz     Lying in bed, ill appearing  CTAB on RA  RRR  Mild abd distention, mild tenderness  No LE edema  Sleepy but arousable.  No asterixis.

## 2019-11-12 NOTE — PROGRESS NOTES
11/12/2019    CD consult acknowledge. CD team will plan on seeing pt on Thursday if she remains appropriate.     UMER Smith

## 2019-11-12 NOTE — PLAN OF CARE
RN assumed cares at 1500, Pt alert and oriented, VS stable throughout the shift.  Pt reports some abdominal pain and discomfort early afternoon.  Appetite good,  encourages food and walks patient around the hospital in the wheelchair.  Pt had episode of emesis this evening after receiving pain and sleep medications, MD paged to see if we should replace.  Pt continues on scheduled lactulose regimen, no BM this shift.  No other acute incidents this shift.  Continue to monitor and notify MD of any changes.

## 2019-11-12 NOTE — PROGRESS NOTES
GASTROENTEROLOGY PROGRESS NOTE    ASSESSMENT:  44 year old female with severe alcohol use disorder, polysubstance abuse and known alcoholic cirrhosis, admitted to [facility] on 11/1/19 with markedly abnormal liver tests (AST 1890, ALT 1068 Tbili 14.5) initially attributed to acute alcoholic hepatitis (MELD 30, .5 on admission). During hospitalization was diagnosed with new hepatitis C infection (negative HCV Norma 10/2018, positive 11/2019). Transferred here for higher level of care.    No new recommendations or diagnoses today.     Acute alcoholic hepatitis in alcoholic cirrhosis, severe, with concomitant new HCV infection:  - AST and ALT decreasing  - Total bili going up, 23.4 today, nonresponder to prednisolone. No indication for this at this point.  - Elevated WBC consistent with AAH  - Abdominal U/S w/ dopplers showing no portal vein thrombosis, no mass  - MRI/MRCP unremarkable  - Ongoing alcohol use documented in chart with positive, high BAL, denied by patient  - Utox positive for amphetamines and opioids on admission  - Unknown timing of new HCV infection. Patient denies most exposures but does admit to inhalation drug use, not forthcoming about last use.     Alcohol use disorder & polysubstance abuse  -Very poor insight and lack of transparency  -Denies use despite multiple positive BAL  -Denies any other drug use including IV drugs. Urine drug screen positive for amphetamines and opiates on 11/2/2019 at OSH and she has had multiple urine drug screens positive for amphetamines in the past 8 months.     Recommendations:  -Intensive CD treatment will be the cornerstone of any hope for recovery  -Given her lack of insight in to her substance use, I am concerned about her chances for success.  -Her mortality with underlying cirrhosis, AAH, new HCV is quite high. This was discussed with her and her .   -We have no specific therapies to offer inpatient.  -I do not see utility in a  "liver biopsy.  -She can return to her home health care system and pursue outpatient hepatitis C treatment   -Encourage high protein, low sodium diet (2g Na), there is no need for fluid restriction from hepatology standpoint  -Nutrition consult and CD consult if she remains inpatient    Ally Segovia MD    Hepatology/Liver Transplant  Medical Director, Liver Transplantation  [facility]    Appointments [phone]  Clinic Fax [phone]  Transplant Care [phone] Option 4  Transplant Fax [phone]  Administrative Office [phone]  Administrative Fax [phone]  ===================================================================      Scribe disclosure:  I, Papito Melendez, am serving as a scribe to document services personally performed by Dr. Ally Segovia, based on data collection and the provider's statements to me.    Thomas Melendez, MS4  Medical Student  [employer]  Division of Gastroenterology, Hepatology, and Nutrition  _______________________________________________________________  S: NAEON. Complains of dyspepsia and generalized epigastric abdominal pain. Denies nausea, vomiting, hematemesis, hematochezia, melena, increasing abdominal girth, pruritis, CP, SOB, cough, fever, chills. Patient reports 1 small brown nonbloody bm overnight. Unsolicited, the patient told writer she is diabetic and must have gotten hepatitis C from her insulin needles. The patient has no documented hx of diabetes. Discussed her utox and BAL results with her today.    O:  Blood pressure 120/46, pulse 107, temperature 96.5  F (35.8  C), temperature source Oral, resp. rate 16, height 1.727 m (5' 8\"), weight 87.7 kg (193 lb 6.4 oz), SpO2 95 %.    Gen: NAD, calm, resting in bed and eating breakfast with  at bedside  HEENT: scleral icteric  CV: No edema, II/XI systolic murmur is present, RRR, no rubs or gallops  Lungs: Unlabored breathing on RA, CTAB  Abd: " NTTP, no peritoneal signs, nondistended, no masses, no hepatosplenomegaly  Skin: jaundiced  Neuro: AAO x 3, no asterixis  Psych: normal affect, poor insight    LABS:  BMP  Recent Labs   Lab 11/12/19  0944 11/11/19  0556   * 129*   POTASSIUM 3.9 3.5   CHLORIDE 92* 96   KEESHA 8.6 8.2*   CO2 27 25   BUN 19 12   CR 1.00 1.00   * 90     CBC  Recent Labs   Lab 11/12/19  0944 11/11/19  0556   WBC 14.6* 13.6*   RBC 4.04 4.36   HGB 11.2* 11.8   HCT 33.2* 35.8   MCV 82 82   MCH 27.7 27.1   MCHC 33.7 33.0   RDW 22.5* 22.0*   * 111*     INR  Recent Labs   Lab 11/12/19  0944 11/11/19  0556   INR 2.79* 2.86*     LFTs  Recent Labs   Lab 11/12/19  0944 11/11/19  0556   ALKPHOS 197* 205*   * 490*   * 440*   BILITOTAL 23.4* 22.1*   PROTTOTAL 5.3* 5.4*   ALBUMIN 1.7* 1.8*      PANCNo lab results found in last 7 days.     MELD-Na score: 33 at 11/12/2019  9:44 AM  MELD score: 30 at 11/12/2019  9:44 AM  Calculated from:  Serum Creatinine: 1.00 mg/dL at 11/12/2019  9:44 AM  Serum Sodium: 127 mmol/L at 11/12/2019  9:44 AM  Total Bilirubin: 23.4 mg/dL at 11/12/2019  9:44 AM  INR(ratio): 2.79 at 11/12/2019  9:44 AM  Age: 44 years    Imaging:    US Abdomen w/ doppler (11/11/19):  - No sonographic evidence of portal venous thrombus as questioned.  - Cirrhotic appearing liver with sequelae of portal hypertension with mild ascites and reversal of flow in all of the portal veins.  - No focal hepatic mass     MRI/MRCP (11/1/19):  FINDINGS: No evidence for choledocholithiasis or cholelithiasis. No intrahepatic  or extrahepatic biliary dilation. There is marked gallbladder wall thickening.  Within the limits of noncontrast technique, no suspicious hepatic lesion.    There are changes of hepatic cirrhosis with prominent portosystemic collateral  formation. Spleen normal in size. There is a large splenorenal shunt in the left  upper quadrant. No hydronephrosis. There are a few cysts noted in the right  kidney. No  evidence for bowel obstruction. Abdominal aorta normal in caliber.    IMPRESSION:  1.  No evidence for cholelithiasis or choledocholithiasis.  2.  Marked gallbladder wall thickening which can be seen in the setting of  cirrhosis or hepatitis. CHF also a consideration please correlate clinically.  3.  Hepatic cirrhosis with very large splenorenal shunt.     US Abdomen w/o doppler (11/1/19):    FINDINGS: No ascites noted. However incidental visualization of distended gallbladder with 6 mm thickened wall and trace free fluid adjacent to it. Common hepatic duct is normal in the common bile duct measures 7 mm minimally overdistended but no obvious filling defect within it.    IMPRESSION:   1.  Findings suggest the possibility of acute acalculous cholecystitis. Minimal ductal dilatation.

## 2019-11-12 NOTE — PROGRESS NOTES
CLINICAL NUTRITION SERVICES - Brief Note:   Reason for Assessment: Provider consult for high protein, high calorie diet, 2g Na restriction -- likely discharge later today or tomorrow.  Please  on how to maintain at home  Diet History: visited with patient and spouse in the room. Patient has fair appetite. Family utilizes some pre packet /process foods. Not following any specific diet at home   Nutrition Diagnosis:  Food- and nutrition-related knowledge deficit r/t no previous knowledge of protein in [facility] as evidence by MD consult   Interventions: Provided instruction on getting adequate protein (not too much/not too little, ~60 grams daily is approximately requires for maintenance).  Reviewed protein sources and portion sizes with patient's usual intake in mind.  Encouraged focus on plant based protein sources and discussed these sources with patient/family. Discussed 2 gm Na+ diet and encouraged spouse to read labels and avoid processed foods.   Goals: Patient was not able to verbalize understanding, however spouse took note and will follow.   Follow-up:    Patient to ask any further nutrition-related questions before discharge.  In addition, pt may request outpatient RD appointment.    Sarah Galvan RD/ROZ  Pager [phone]

## 2019-11-13 ENCOUNTER — APPOINTMENT (OUTPATIENT)
Dept: CARDIOLOGY | Facility: CLINIC | Age: 44
DRG: 433 | End: 2019-11-13
Attending: STUDENT IN AN ORGANIZED HEALTH CARE EDUCATION/TRAINING PROGRAM
Payer: COMMERCIAL

## 2019-11-13 ENCOUNTER — APPOINTMENT (OUTPATIENT)
Dept: GENERAL RADIOLOGY | Facility: CLINIC | Age: 44
DRG: 433 | End: 2019-11-13
Attending: STUDENT IN AN ORGANIZED HEALTH CARE EDUCATION/TRAINING PROGRAM
Payer: COMMERCIAL

## 2019-11-13 LAB
ALBUMIN SERPL-MCNC: 1.6 G/DL (ref 3.4–5)
ALP SERPL-CCNC: 185 U/L (ref 40–150)
ALT SERPL W P-5'-P-CCNC: 334 U/L (ref 0–50)
ANION GAP SERPL CALCULATED.3IONS-SCNC: 9 MMOL/L (ref 3–14)
AST SERPL W P-5'-P-CCNC: 354 U/L (ref 0–45)
BASOPHILS # BLD AUTO: 0.1 10E9/L (ref 0–0.2)
BASOPHILS NFR BLD AUTO: 0.4 %
BILIRUB SERPL-MCNC: 22.2 MG/DL (ref 0.2–1.3)
BUN SERPL-MCNC: 27 MG/DL (ref 7–30)
CALCIUM SERPL-MCNC: 9 MG/DL (ref 8.5–10.1)
CHLORIDE SERPL-SCNC: 91 MMOL/L (ref 94–109)
CO2 SERPL-SCNC: 25 MMOL/L (ref 20–32)
CREAT SERPL-MCNC: 1.23 MG/DL (ref 0.52–1.04)
DIFFERENTIAL METHOD BLD: ABNORMAL
EOSINOPHIL # BLD AUTO: 0.3 10E9/L (ref 0–0.7)
EOSINOPHIL NFR BLD AUTO: 1.9 %
ERYTHROCYTE [DISTWIDTH] IN BLOOD BY AUTOMATED COUNT: 22.7 % (ref 10–15)
GFR SERPL CREATININE-BSD FRML MDRD: 53 ML/MIN/{1.73_M2}
GLUCOSE SERPL-MCNC: 92 MG/DL (ref 70–99)
HCT VFR BLD AUTO: 32.5 % (ref 35–47)
HGB BLD-MCNC: 10.8 G/DL (ref 11.7–15.7)
IMM GRANULOCYTES # BLD: 0.8 10E9/L (ref 0–0.4)
IMM GRANULOCYTES NFR BLD: 4.9 %
INR PPP: 2.99 (ref 0.86–1.14)
LYMPHOCYTES # BLD AUTO: 1.8 10E9/L (ref 0.8–5.3)
LYMPHOCYTES NFR BLD AUTO: 10.8 %
MCH RBC QN AUTO: 27.1 PG (ref 26.5–33)
MCHC RBC AUTO-ENTMCNC: 33.2 G/DL (ref 31.5–36.5)
MCV RBC AUTO: 82 FL (ref 78–100)
MONOCYTES # BLD AUTO: 1.4 10E9/L (ref 0–1.3)
MONOCYTES NFR BLD AUTO: 8.5 %
NEUTROPHILS # BLD AUTO: 12.2 10E9/L (ref 1.6–8.3)
NEUTROPHILS NFR BLD AUTO: 73.5 %
NRBC # BLD AUTO: 0.1 10*3/UL
NRBC BLD AUTO-RTO: 0 /100
PLATELET # BLD AUTO: 127 10E9/L (ref 150–450)
POTASSIUM SERPL-SCNC: 4.2 MMOL/L (ref 3.4–5.3)
PROT SERPL-MCNC: 5 G/DL (ref 6.8–8.8)
RBC # BLD AUTO: 3.99 10E12/L (ref 3.8–5.2)
SODIUM SERPL-SCNC: 126 MMOL/L (ref 133–144)
WBC # BLD AUTO: 16.6 10E9/L (ref 4–11)

## 2019-11-13 PROCEDURE — 85025 COMPLETE CBC W/AUTO DIFF WBC: CPT | Performed by: STUDENT IN AN ORGANIZED HEALTH CARE EDUCATION/TRAINING PROGRAM

## 2019-11-13 PROCEDURE — 12000001 ZZH R&B MED SURG/OB UMMC

## 2019-11-13 PROCEDURE — 25000132 ZZH RX MED GY IP 250 OP 250 PS 637: Performed by: HOSPITALIST

## 2019-11-13 PROCEDURE — 40000893 ZZH STATISTIC PT IP EVAL DEFER

## 2019-11-13 PROCEDURE — 25000128 H RX IP 250 OP 636: Performed by: STUDENT IN AN ORGANIZED HEALTH CARE EDUCATION/TRAINING PROGRAM

## 2019-11-13 PROCEDURE — 85610 PROTHROMBIN TIME: CPT | Performed by: STUDENT IN AN ORGANIZED HEALTH CARE EDUCATION/TRAINING PROGRAM

## 2019-11-13 PROCEDURE — 25000132 ZZH RX MED GY IP 250 OP 250 PS 637: Performed by: PHYSICIAN ASSISTANT

## 2019-11-13 PROCEDURE — P9047 ALBUMIN (HUMAN), 25%, 50ML: HCPCS | Performed by: STUDENT IN AN ORGANIZED HEALTH CARE EDUCATION/TRAINING PROGRAM

## 2019-11-13 PROCEDURE — 25000128 H RX IP 250 OP 636: Performed by: NURSE PRACTITIONER

## 2019-11-13 PROCEDURE — 36415 COLL VENOUS BLD VENIPUNCTURE: CPT | Performed by: STUDENT IN AN ORGANIZED HEALTH CARE EDUCATION/TRAINING PROGRAM

## 2019-11-13 PROCEDURE — 93306 TTE W/DOPPLER COMPLETE: CPT | Mod: 26 | Performed by: INTERNAL MEDICINE

## 2019-11-13 PROCEDURE — 80053 COMPREHEN METABOLIC PANEL: CPT | Performed by: STUDENT IN AN ORGANIZED HEALTH CARE EDUCATION/TRAINING PROGRAM

## 2019-11-13 PROCEDURE — 25000132 ZZH RX MED GY IP 250 OP 250 PS 637: Performed by: NURSE PRACTITIONER

## 2019-11-13 PROCEDURE — 25800030 ZZH RX IP 258 OP 636: Performed by: HOSPITALIST

## 2019-11-13 PROCEDURE — 25000132 ZZH RX MED GY IP 250 OP 250 PS 637: Performed by: STUDENT IN AN ORGANIZED HEALTH CARE EDUCATION/TRAINING PROGRAM

## 2019-11-13 PROCEDURE — 40000894 ZZH STATISTIC OT IP EVAL DEFER: Performed by: OCCUPATIONAL THERAPIST

## 2019-11-13 PROCEDURE — 99233 SBSQ HOSP IP/OBS HIGH 50: CPT | Performed by: STUDENT IN AN ORGANIZED HEALTH CARE EDUCATION/TRAINING PROGRAM

## 2019-11-13 PROCEDURE — 74018 RADEX ABDOMEN 1 VIEW: CPT

## 2019-11-13 PROCEDURE — 99207 ZZC NO CHARGE LOS: CPT | Performed by: STUDENT IN AN ORGANIZED HEALTH CARE EDUCATION/TRAINING PROGRAM

## 2019-11-13 PROCEDURE — 93306 TTE W/DOPPLER COMPLETE: CPT

## 2019-11-13 RX ORDER — LACTULOSE 10 G/15ML
30 SOLUTION ORAL 2 TIMES DAILY
Status: DISCONTINUED | OUTPATIENT
Start: 2019-11-13 | End: 2019-11-19 | Stop reason: HOSPADM

## 2019-11-13 RX ORDER — LACTULOSE 10 G/15ML
20 SOLUTION ORAL
Status: DISCONTINUED | OUTPATIENT
Start: 2019-11-13 | End: 2019-11-19 | Stop reason: HOSPADM

## 2019-11-13 RX ORDER — ALBUMIN (HUMAN) 12.5 G/50ML
50 SOLUTION INTRAVENOUS ONCE
Status: COMPLETED | OUTPATIENT
Start: 2019-11-13 | End: 2019-11-13

## 2019-11-13 RX ORDER — LACTULOSE 10 G/15ML
100 SOLUTION ORAL
Status: DISCONTINUED | OUTPATIENT
Start: 2019-11-13 | End: 2019-11-19 | Stop reason: HOSPADM

## 2019-11-13 RX ORDER — PANTOPRAZOLE SODIUM 40 MG/1
40 TABLET, DELAYED RELEASE ORAL
Status: DISCONTINUED | OUTPATIENT
Start: 2019-11-13 | End: 2019-11-19 | Stop reason: HOSPADM

## 2019-11-13 RX ORDER — CALCIUM CARBONATE 500 MG/1
500 TABLET, CHEWABLE ORAL 3 TIMES DAILY PRN
Status: DISCONTINUED | OUTPATIENT
Start: 2019-11-13 | End: 2019-11-19 | Stop reason: HOSPADM

## 2019-11-13 RX ADMIN — LACTULOSE 20 G: 20 SOLUTION ORAL at 08:53

## 2019-11-13 RX ADMIN — PANTOPRAZOLE SODIUM 40 MG: 40 TABLET, DELAYED RELEASE ORAL at 15:41

## 2019-11-13 RX ADMIN — SPIRONOLACTONE 50 MG: 50 TABLET ORAL at 08:53

## 2019-11-13 RX ADMIN — GABAPENTIN 300 MG: 300 CAPSULE ORAL at 08:53

## 2019-11-13 RX ADMIN — CALCIUM CARBONATE (ANTACID) CHEW TAB 500 MG 500 MG: 500 CHEW TAB at 15:45

## 2019-11-13 RX ADMIN — ALBUMIN HUMAN 50 G: 0.25 SOLUTION INTRAVENOUS at 17:13

## 2019-11-13 RX ADMIN — HYDROMORPHONE HYDROCHLORIDE 4 MG: 2 TABLET ORAL at 05:54

## 2019-11-13 RX ADMIN — Medication 25 MG: at 20:42

## 2019-11-13 RX ADMIN — RIFAXIMIN 550 MG: 550 TABLET ORAL at 20:41

## 2019-11-13 RX ADMIN — LACTULOSE 30 ML: 20 SOLUTION ORAL at 20:41

## 2019-11-13 RX ADMIN — GABAPENTIN 300 MG: 300 CAPSULE ORAL at 15:41

## 2019-11-13 RX ADMIN — GABAPENTIN 300 MG: 300 CAPSULE ORAL at 20:41

## 2019-11-13 RX ADMIN — PANTOPRAZOLE SODIUM 40 MG: 40 TABLET, DELAYED RELEASE ORAL at 08:53

## 2019-11-13 RX ADMIN — CALCIUM CARBONATE (ANTACID) CHEW TAB 500 MG 500 MG: 500 CHEW TAB at 20:48

## 2019-11-13 RX ADMIN — CALCIUM CARBONATE (ANTACID) CHEW TAB 500 MG 500 MG: 500 CHEW TAB at 06:33

## 2019-11-13 RX ADMIN — FUROSEMIDE 40 MG: 40 TABLET ORAL at 08:53

## 2019-11-13 RX ADMIN — GABAPENTIN 300 MG: 300 CAPSULE ORAL at 23:00

## 2019-11-13 RX ADMIN — CALCIUM CARBONATE (ANTACID) CHEW TAB 500 MG 500 MG: 500 CHEW TAB at 00:17

## 2019-11-13 RX ADMIN — HYDROMORPHONE HYDROCHLORIDE 4 MG: 2 TABLET ORAL at 20:48

## 2019-11-13 RX ADMIN — PROCHLORPERAZINE EDISYLATE 10 MG: 5 INJECTION INTRAMUSCULAR; INTRAVENOUS at 06:33

## 2019-11-13 RX ADMIN — ONDANSETRON 4 MG: 2 INJECTION INTRAMUSCULAR; INTRAVENOUS at 05:48

## 2019-11-13 RX ADMIN — THERA TABS 1 TABLET: TAB at 08:52

## 2019-11-13 RX ADMIN — ONDANSETRON 4 MG: 2 INJECTION INTRAMUSCULAR; INTRAVENOUS at 12:18

## 2019-11-13 RX ADMIN — SODIUM CHLORIDE, POTASSIUM CHLORIDE, SODIUM LACTATE AND CALCIUM CHLORIDE 250 ML: 600; 310; 30; 20 INJECTION, SOLUTION INTRAVENOUS at 08:52

## 2019-11-13 RX ADMIN — RIFAXIMIN 550 MG: 550 TABLET ORAL at 08:52

## 2019-11-13 ASSESSMENT — ACTIVITIES OF DAILY LIVING (ADL)
ADLS_ACUITY_SCORE: 10

## 2019-11-13 NOTE — CONSULTS
11/13/2019    CD consult acknowledged. Per chart review, pt is returning home to [place], [place]. CD team is unfamiliar with treatment in [place]. Pt is encouraged to contact her insurance and see what is covered for CD treatment/resources in her area if she is interested in pursuing treatment.     Julia Cedeno, [facility]  [phone]

## 2019-11-13 NOTE — PLAN OF CARE
Paged to gold crosscover @ [phone]:    Pt vomited again, given Compazine x1. BP low @ 93/29. C/o slight chest pain.

## 2019-11-13 NOTE — PROGRESS NOTES
Care Coordinator Progress Note    Admission Date/Time:  11/10/2019  Attending MD:  Jasmin Cat MD    Data  Chart reviewed, discussed with interdisciplinary team.   Patient was admitted for:    Alcoholic cirrhosis of liver with ascites (H)  Chronic pain syndrome  Gastroesophageal reflux disease without esophagitis  Hepatitis.    Concerns with insurance coverage for discharge needs: insurance coverage is inadequate; [email or web address]  Current Living Situation: Patient lives with spouse in [facility]  Support System: Supportive and Involved  Services Involved: none  Transportation at Discharge: None  Transportation to Medical Appointments:  - spouse  Barriers to Discharge: medical needs, transportation home to [place]       Coordination of Care  D: Plan of care discussed with Medical Team at Interdisciplinary Rounds, plan for patient to discharge when medically improved.     I/A: Chart reviewed; met with patient and spouse at bedside to confirm home support and living arrangements. Both lack transportation to return home (arrived by transfer from OSH, [facility]). Spouse reports has no picture ID or drivers license (ran out of the house without it). Patient reports she has a picture ID. Both report they have no family that is able to provide return transport home. Family is coming to visit on Friday 11/15/19. Inquired if they can travel back with family (if medically ready for dc) on Friday. Spouse reports there will be no room in the car.    Situation discussed with Accommodations Assistant Mariel Issa. Mariel is able to arrange for taxi to get to bus station as well as purchase bus tickets for patient and spouse to return to [facility]. Neither will need a picture ID to board the bus if the tickets have been pre-purchased. No ticket purchase will be made until patient is confirmed medically ready for dc as they are not refundable.    Spouse requested meal tickets for next few days (reported  to LUCINA Mendoza for assistance).      P: Care Coordinator will continue to follow.      Referrals: Provided patient/family with options for none needed at this time.          Plan  Anticipated Discharge Date:  TBD by medical needs  Anticipated Discharge Plan:  Home to [place]      Mary Hardy RN, BSN, PHN  Internal Medicine Care Coordinator  [facility]  Desk Phone: [phone]  Pager: [phone]    To contact Weekend RNCC, dial * [phone] and enter job code [phone] at prompt.   This pager can not be contacted by text page or outside line.

## 2019-11-13 NOTE — PLAN OF CARE
5B defer  Discharge Planner PT   Patient plan for discharge: home  Current status: up encountered in supine, per discussion with pt and chart review, pt is up IND in room and in hallway. Anticipated discharge to home tomorrow, has not concerns with return to home. Open to OP PT referral after discharge. PT to complete orders and sign off.   Barriers to return to prior living situation: medical status  Recommendations for discharge: anticipate to home, OP PT  Rationale for recommendations: pt denies concerns with return to home, mobilizing IND in hospital.        Entered by: Papito De Leon 11/13/2019 9:15 AM

## 2019-11-13 NOTE — PROVIDER NOTIFICATION
"Paged to gold crosscover @ [phone]:    \"Pt c/o increased heartburn w/ emesis x1. Given Zofran but wants more heartburn med. Already gave her 1x tums PRN dose @0017\"     "

## 2019-11-13 NOTE — PLAN OF CARE
Pt AOx4, VSS on RA except hypotensive @ 93/29. Up ad ray. No BM overnight. Given tums x2 for heartburn. C/o abd pain & slight chest pain, given dilaudid x1. Emesis x2 this morning, given zofran x1 w/no relief & compazine x1. MD notified. Plan to discharge later at 8am to [place]. Continue to monitor & follow POC.

## 2019-11-13 NOTE — PLAN OF CARE
A&Ox4. VSS on RA. Up independently, eating well. Pt c/o abd pain, gave PRN dilaudid and pepto bismol. Plan for discharge tomorrow at 8am, will be given bus ticket to [place] with spouse. Continue to monitor.

## 2019-11-13 NOTE — PROGRESS NOTES
Sleeping most of the day. Eating well. She does complain of intermittent nausea - received zofran IV with relief. Has not vomited today. BM x1- brown color. WBC 16.6. Vitals are stable. Declined any pain medication. Continue to monitor for any changes.

## 2019-11-13 NOTE — PROGRESS NOTES
Social Work Services Progress Note    Hospital Day: 3    Data:  Pt is a 44 year old female admitted to [facility] on 11/10/2019.     Intervention:  Pt's  requested meal tickets - SW provided him w/ five. He was very grateful for the resource.     Assessment:  Pt asleep in bed    Plan:    Anticipated Disposition:  Home, no needs identified    Barriers to d/c plan:  Medical stability    Follow Up:  SW will remain available and continue to follow, assess for needs, and assist in discharge planning.     ALEX Burgos, [address]   Pager [phone]  Phone [phone]

## 2019-11-13 NOTE — PROGRESS NOTES
Brief GI Note:    Nausea and vomiting overnight with hypotension this morning. LFTs continue to rise and Cr elevated today. Remains tenuous with known severe alcoholic hepatitis on cirrhosis and MELD-Na of 35.    -- Hold diuretics in setting of hypotension  -- Consider albumin for volume expansion if needed  -- Agree with PPI  -- Monitor stool and vomitus for bleeding  -- Trend hemoglobin, transfuse <7 from GI standpoint  -- Daily CMP, INR  -- Inpatient GI will follow    Patient seen and discussed with Dr. Segovia.    Blanca Tavarez MD  GI Fellow, PGY-5  P: [phone]

## 2019-11-13 NOTE — PROGRESS NOTES
[facility], [place]    Medicine Progress Note - Hospitalist Service, Gold 8       Date of Admission:  11/10/2019  Assessment & Plan   Jessie Sanchez is a 44 year old female admitted on 11/10/2019. She has a history of alcohol abuse, cirrhosis and diastolic heart failure and transferred to our facility from an outside hospital where she has been admitted for hepatitis from 11/1/2019    TODAY  - Continue lactulose, start rifaximin and additional PRN lactulose  - Trend labs  - TTE to r/o heart failure  - AXR to r/o obstruction  - Stop [id number] and start PPI  - Hold diuretics. PRN fluids for hypotension (would give albumin).   - MELD labs worsening  - PT/OT consult    # New HCV infection  # Alcoholic hepatitis  # Hx of alcoholic cirrhosis  # Hyponatremia, coagulopathy, thrmobocytopenia  - Was treated with steroids which has been stopped due to increased lille score. Hep C RNA showed 3.8 million viral count. US without clot.   Hepatology was consulted and recommended intensive outpatient chem dep treatment for alcohol cessation (last drink 2 months ago) and outpatient hepatology follow-up to start HCV treatment.   - HE: Continue lactulose, start rifaximin  - Ascites/edema: Hold lasix, spironolactone.  PRN IVFs given hypotension.   -- EV: None on EGD in 12/2018.  Repeat due, can be done outpatient.  TTE   -- Inpatient chem dep consult  - Folic acid, MVI, thiamine  MELD-Na score: 35 at 11/13/2019 10:20 AM  MELD score: 32 at 11/13/2019 10:20 AM  Calculated from:  Serum Creatinine: 1.23 mg/dL at 11/13/2019 10:20 AM  Serum Sodium: 126 mmol/L at 11/13/2019 10:20 AM  Total Bilirubin: 22.2 mg/dL at 11/13/2019 10:20 AM  INR(ratio): 2.99 at 11/13/2019 10:20 AM  Age: 44 years      # Anemia, stable -- monitor  # EPigastric pain -- Trend Hb.  Start PPI.   Some gastritis/esophagitis on EGD in 12/2018.  AXR tor/o obstruction.        Diet: Combination Diet Regular Diet Adult  Room Service  Diet    DVT  Prophylaxis: low platelet count. SCDs  Irby Catheter: not present  Code Status: Full Code      Disposition Plan   Expected discharge: 2-3 days, recommended to prior living arrangement once adequate pain management/ tolerating PO medications.  Entered: Jasmin Cat MD 11/13/2019, 2:19 PM       The patient's care was discussed with the Patient and Hepatology Consultant.    Jasmin Cat MD  Hospitalist Service, [address], [place]  Pager: [phone]  Please see sticky note for cross cover information  ______________________________________________________________________    Interval History   More nausea overnight.  Overall feeling unwell.  Some abdominal/epigastric discomfort.     Denies fever/chills, chest pain, shortness of breathing.       Data reviewed today: I reviewed all medications, new labs and imaging results over the last 24 hours. I personally reviewed no images or EKG's today.    Physical Exam   Vital Signs: Temp: 96.1  F (35.6  C) Temp src: Oral BP: (!) 98/33 Pulse: 99   Resp: 16 SpO2: 94 % O2 Device: None (Room air)    Weight: 200 lbs 1.6 oz     Lying in bed, ill appearing  CTAB on RA  RRR  Mild abd distention, mild tenderness  No LE edema  Sleepy but arousable.  + asterixis.

## 2019-11-13 NOTE — PLAN OF CARE
Discharge Planner OT   Defer OT evaluation  Patient plan for discharge: home  Current status: Pt up Ind in room, no functional limitations  Barriers to return to prior living situation:  none  Recommendations for discharge: home  Rationale for recommendations: Anticipate pt will be able to complete ADLs safely with A from family prn at time of discharge.        Entered by: Cobly Rivas 11/13/2019 9:26 AM

## 2019-11-14 LAB
ALBUMIN SERPL-MCNC: 2.2 G/DL (ref 3.4–5)
ALBUMIN UR-MCNC: 10 MG/DL
ALP SERPL-CCNC: 153 U/L (ref 40–150)
ALT SERPL W P-5'-P-CCNC: 249 U/L (ref 0–50)
ANION GAP SERPL CALCULATED.3IONS-SCNC: 10 MMOL/L (ref 3–14)
APPEARANCE UR: ABNORMAL
AST SERPL W P-5'-P-CCNC: 260 U/L (ref 0–45)
BACTERIA #/AREA URNS HPF: ABNORMAL /HPF
BASOPHILS # BLD AUTO: 0 10E9/L (ref 0–0.2)
BASOPHILS NFR BLD AUTO: 0.2 %
BILIRUB SERPL-MCNC: 22.4 MG/DL (ref 0.2–1.3)
BILIRUB UR QL STRIP: ABNORMAL
BUN SERPL-MCNC: 33 MG/DL (ref 7–30)
CALCIUM SERPL-MCNC: 9.4 MG/DL (ref 8.5–10.1)
CHLORIDE SERPL-SCNC: 89 MMOL/L (ref 94–109)
CO2 SERPL-SCNC: 26 MMOL/L (ref 20–32)
COLOR UR AUTO: ABNORMAL
CREAT SERPL-MCNC: 1.18 MG/DL (ref 0.52–1.04)
DIFFERENTIAL METHOD BLD: ABNORMAL
EOSINOPHIL # BLD AUTO: 0.8 10E9/L (ref 0–0.7)
EOSINOPHIL NFR BLD AUTO: 5.2 %
ERYTHROCYTE [DISTWIDTH] IN BLOOD BY AUTOMATED COUNT: 22.6 % (ref 10–15)
GFR SERPL CREATININE-BSD FRML MDRD: 56 ML/MIN/{1.73_M2}
GLUCOSE BLDC GLUCOMTR-MCNC: 97 MG/DL (ref 70–99)
GLUCOSE SERPL-MCNC: 67 MG/DL (ref 70–99)
GLUCOSE UR STRIP-MCNC: NEGATIVE MG/DL
HCT VFR BLD AUTO: 29.3 % (ref 35–47)
HCV AB SERPL QL IA: REACTIVE
HCV RNA SERPL NAA+PROBE-ACNC: ABNORMAL [IU]/ML
HCV RNA SERPL NAA+PROBE-ACNC: ABNORMAL [IU]/ML
HCV RNA SERPL NAA+PROBE-LOG IU: 4.9 LOG IU/ML
HCV RNA SERPL NAA+PROBE-LOG IU: 4.9 LOG IU/ML
HGB BLD-MCNC: 10.1 G/DL (ref 11.7–15.7)
HGB UR QL STRIP: ABNORMAL
HYALINE CASTS #/AREA URNS LPF: 4 /LPF (ref 0–2)
IMM GRANULOCYTES # BLD: 0.5 10E9/L (ref 0–0.4)
IMM GRANULOCYTES NFR BLD: 3.2 %
INR PPP: 2.99 (ref 0.86–1.14)
KETONES UR STRIP-MCNC: NEGATIVE MG/DL
LACTATE BLD-SCNC: 2 MMOL/L (ref 0.7–2)
LACTATE BLD-SCNC: 2.3 MMOL/L (ref 0.7–2)
LEUKOCYTE ESTERASE UR QL STRIP: ABNORMAL
LYMPHOCYTES # BLD AUTO: 2.2 10E9/L (ref 0.8–5.3)
LYMPHOCYTES NFR BLD AUTO: 13.6 %
MCH RBC QN AUTO: 27.5 PG (ref 26.5–33)
MCHC RBC AUTO-ENTMCNC: 34.5 G/DL (ref 31.5–36.5)
MCV RBC AUTO: 80 FL (ref 78–100)
MONOCYTES # BLD AUTO: 1.8 10E9/L (ref 0–1.3)
MONOCYTES NFR BLD AUTO: 11.2 %
MUCOUS THREADS #/AREA URNS LPF: PRESENT /LPF
NEUTROPHILS # BLD AUTO: 10.7 10E9/L (ref 1.6–8.3)
NEUTROPHILS NFR BLD AUTO: 66.6 %
NITRATE UR QL: POSITIVE
NRBC # BLD AUTO: 0 10*3/UL
NRBC BLD AUTO-RTO: 0 /100
PH UR STRIP: 5.5 PH (ref 5–7)
PLATELET # BLD AUTO: 94 10E9/L (ref 150–450)
POTASSIUM SERPL-SCNC: 4.3 MMOL/L (ref 3.4–5.3)
PROT SERPL-MCNC: 4.9 G/DL (ref 6.8–8.8)
RBC # BLD AUTO: 3.67 10E12/L (ref 3.8–5.2)
RBC #/AREA URNS AUTO: 18 /HPF (ref 0–2)
SODIUM SERPL-SCNC: 126 MMOL/L (ref 133–144)
SOURCE: ABNORMAL
SP GR UR STRIP: 1.02 (ref 1–1.03)
SQUAMOUS #/AREA URNS AUTO: 19 /HPF (ref 0–1)
TRANS CELLS #/AREA URNS HPF: <1 /HPF (ref 0–1)
UROBILINOGEN UR STRIP-MCNC: NORMAL MG/DL (ref 0–2)
WBC # BLD AUTO: 16.1 10E9/L (ref 4–11)
WBC #/AREA URNS AUTO: 12 /HPF (ref 0–5)

## 2019-11-14 PROCEDURE — 76705 ECHO EXAM OF ABDOMEN: CPT | Mod: 26 | Performed by: PEDIATRICS

## 2019-11-14 PROCEDURE — 25000132 ZZH RX MED GY IP 250 OP 250 PS 637: Performed by: NURSE PRACTITIONER

## 2019-11-14 PROCEDURE — 81001 URINALYSIS AUTO W/SCOPE: CPT | Performed by: STUDENT IN AN ORGANIZED HEALTH CARE EDUCATION/TRAINING PROGRAM

## 2019-11-14 PROCEDURE — 87088 URINE BACTERIA CULTURE: CPT | Performed by: STUDENT IN AN ORGANIZED HEALTH CARE EDUCATION/TRAINING PROGRAM

## 2019-11-14 PROCEDURE — 83605 ASSAY OF LACTIC ACID: CPT | Performed by: STUDENT IN AN ORGANIZED HEALTH CARE EDUCATION/TRAINING PROGRAM

## 2019-11-14 PROCEDURE — 80053 COMPREHEN METABOLIC PANEL: CPT | Performed by: STUDENT IN AN ORGANIZED HEALTH CARE EDUCATION/TRAINING PROGRAM

## 2019-11-14 PROCEDURE — 36415 COLL VENOUS BLD VENIPUNCTURE: CPT | Performed by: STUDENT IN AN ORGANIZED HEALTH CARE EDUCATION/TRAINING PROGRAM

## 2019-11-14 PROCEDURE — P9047 ALBUMIN (HUMAN), 25%, 50ML: HCPCS | Performed by: STUDENT IN AN ORGANIZED HEALTH CARE EDUCATION/TRAINING PROGRAM

## 2019-11-14 PROCEDURE — 25000132 ZZH RX MED GY IP 250 OP 250 PS 637: Performed by: STUDENT IN AN ORGANIZED HEALTH CARE EDUCATION/TRAINING PROGRAM

## 2019-11-14 PROCEDURE — 87086 URINE CULTURE/COLONY COUNT: CPT | Performed by: STUDENT IN AN ORGANIZED HEALTH CARE EDUCATION/TRAINING PROGRAM

## 2019-11-14 PROCEDURE — 85025 COMPLETE CBC W/AUTO DIFF WBC: CPT | Performed by: STUDENT IN AN ORGANIZED HEALTH CARE EDUCATION/TRAINING PROGRAM

## 2019-11-14 PROCEDURE — 83605 ASSAY OF LACTIC ACID: CPT

## 2019-11-14 PROCEDURE — 87186 SC STD MICRODIL/AGAR DIL: CPT | Performed by: STUDENT IN AN ORGANIZED HEALTH CARE EDUCATION/TRAINING PROGRAM

## 2019-11-14 PROCEDURE — 25000128 H RX IP 250 OP 636: Performed by: STUDENT IN AN ORGANIZED HEALTH CARE EDUCATION/TRAINING PROGRAM

## 2019-11-14 PROCEDURE — 00000146 ZZHCL STATISTIC GLUCOSE BY METER IP

## 2019-11-14 PROCEDURE — 99207 ZZC NO CHARGE LOS: CPT | Performed by: STUDENT IN AN ORGANIZED HEALTH CARE EDUCATION/TRAINING PROGRAM

## 2019-11-14 PROCEDURE — 87040 BLOOD CULTURE FOR BACTERIA: CPT | Performed by: STUDENT IN AN ORGANIZED HEALTH CARE EDUCATION/TRAINING PROGRAM

## 2019-11-14 PROCEDURE — 85610 PROTHROMBIN TIME: CPT | Performed by: STUDENT IN AN ORGANIZED HEALTH CARE EDUCATION/TRAINING PROGRAM

## 2019-11-14 PROCEDURE — 12000001 ZZH R&B MED SURG/OB UMMC

## 2019-11-14 RX ORDER — ALBUMIN (HUMAN) 12.5 G/50ML
100 SOLUTION INTRAVENOUS DAILY
Status: COMPLETED | OUTPATIENT
Start: 2019-11-14 | End: 2019-11-15

## 2019-11-14 RX ADMIN — LACTULOSE 20 G: 20 SOLUTION ORAL at 15:36

## 2019-11-14 RX ADMIN — LACTULOSE 20 G: 20 SOLUTION ORAL at 10:28

## 2019-11-14 RX ADMIN — HYDROMORPHONE HYDROCHLORIDE 4 MG: 2 TABLET ORAL at 09:27

## 2019-11-14 RX ADMIN — HYDROMORPHONE HYDROCHLORIDE 4 MG: 2 TABLET ORAL at 13:01

## 2019-11-14 RX ADMIN — HYDROMORPHONE HYDROCHLORIDE 4 MG: 2 TABLET ORAL at 00:58

## 2019-11-14 RX ADMIN — PROCHLORPERAZINE MALEATE 10 MG: 10 TABLET ORAL at 01:02

## 2019-11-14 RX ADMIN — PANTOPRAZOLE SODIUM 40 MG: 40 TABLET, DELAYED RELEASE ORAL at 08:10

## 2019-11-14 RX ADMIN — GABAPENTIN 300 MG: 300 CAPSULE ORAL at 22:56

## 2019-11-14 RX ADMIN — ALBUMIN HUMAN 100 G: 0.25 SOLUTION INTRAVENOUS at 10:29

## 2019-11-14 RX ADMIN — GABAPENTIN 300 MG: 300 CAPSULE ORAL at 13:02

## 2019-11-14 RX ADMIN — RIFAXIMIN 550 MG: 550 TABLET ORAL at 08:10

## 2019-11-14 RX ADMIN — THERA TABS 1 TABLET: TAB at 08:10

## 2019-11-14 RX ADMIN — LACTULOSE 30 ML: 20 SOLUTION ORAL at 21:17

## 2019-11-14 RX ADMIN — RIFAXIMIN 550 MG: 550 TABLET ORAL at 21:17

## 2019-11-14 RX ADMIN — GABAPENTIN 300 MG: 300 CAPSULE ORAL at 21:17

## 2019-11-14 RX ADMIN — PANTOPRAZOLE SODIUM 40 MG: 40 TABLET, DELAYED RELEASE ORAL at 15:36

## 2019-11-14 RX ADMIN — GABAPENTIN 300 MG: 300 CAPSULE ORAL at 08:10

## 2019-11-14 RX ADMIN — LACTULOSE 30 ML: 20 SOLUTION ORAL at 08:10

## 2019-11-14 ASSESSMENT — ACTIVITIES OF DAILY LIVING (ADL)
ADLS_ACUITY_SCORE: 10

## 2019-11-14 ASSESSMENT — MIFFLIN-ST. JEOR: SCORE: 1614.77

## 2019-11-14 NOTE — PROVIDER NOTIFICATION
Critical Lactic, paged Dr. Cat, waiting for call back.      [id number] MD called back, results read to him.

## 2019-11-14 NOTE — PROGRESS NOTES
Vitals are stable, she is afebrile. Abdominal pain same as yesterday - received dilaudid 4mg Po x2. She is sleeping a lot and states that she is confused at times. Able to say that she is in hospital but unclear as to which one. Given an extra dose of lactulose. Triggered sepsis protocol: lactate 2.3. Received albumin 100g, will recheck lactate this afternoon. Appetite is excellent, she is eating well. UA/UC to be collected.  Encourage activity and nutrition as tolerated. Monitor for and changes in status.

## 2019-11-14 NOTE — PLAN OF CARE
Reason for admission: Hepatitis  Status: RN assumed cares at 2300.  Spouse present in room, involved w/ cares. VSS, hypotensive 96/33 but baseline. Breathing adequately on RA. Denies SOB.  C/o abdominal pain, oral dilaudid administered x1. Oral compazine administered x1 for mild nausea. Pt alert and oriented x4. Jaundiced skin noted. Last BM last evening per pt report. Voiding appropriately. Regular diet, no emesis overnight. Up independently in room.  Plan of care: Pt to potentially discharge if nausea improves. Will need ride set up to [place]. Continue to monitor and follow POC.

## 2019-11-14 NOTE — PLAN OF CARE
A&Ox4. VSS on RA. Pt sleepy and napped most of day. Eating well. Pt c/o abdominal pain and heartburn, gave PRN dilaudid and tums x1. No complaints of nausea. Continue to monitor for any changes.

## 2019-11-14 NOTE — CONSULTS
Brief CAPS Consult Note    I was contacted to evaluate Jessie Grayson for possible bedside diagnostic paracentesis.  A bedside ultrasound was performed and noted:    RUQ: Trace ascites  RLQ: Trace ascites  LUQ: Trace ascites  LLQ: Trace ascites    Additional images obtained:  RUQ posterior to ribs: ascites located superior to liver but posterior to ribs    Given only trace ascites or pockets posterior to ribs, no safe location for bedside diagnostic paracentesis was found.  If high clinical suspicion or ongoing need for sample, may review with IR if they would attempt to sample the suprahepatic fluid collection.    Images saved to the chart.  Please contact us with questions or concerns.    Diana Bardales MD  CAPS Team  11/14/2019

## 2019-11-14 NOTE — PROGRESS NOTES
[facility], [facility] Progress Note - Hospitalist Service, Gold 8       Date of Admission:  11/10/2019  Assessment & Plan   Jessie Sanchez is a 44 year old female admitted on 11/10/2019. She has a history of alcohol abuse, cirrhosis and diastolic heart failure and transferred to our facility from an outside hospital where she has been admitted for hepatitis from 11/1/2019    TODAY  - Continue lactulose and rifaximin  - Infectious workup  - Hold diuretics. Albumin challenge  - MELD labs stable  - Calorie counts --> push diet  - Ambulate    # New HCV infection  # Alcoholic hepatitis  # Hx of alcoholic cirrhosis  # Hyponatremia, coagulopathy, thrmobocytopenia  # MARISSA -- pre-renal vs. hepatoreanl  - Was treated with steroids which has been stopped due to increased lille score. Hep C RNA showed 3.8 million viral count. US without clot.   Hepatology was consulted and recommended intensive outpatient chem dep treatment for alcohol cessation (last drink 2 months ago) and outpatient hepatology follow-up to start HCV treatment.   - HE: Continue lactulose, start rifaximin  - Ascites/edema: Hold lasix, spironolactone.  PRN IVFs given hypotension.   -- EV: None on EGD in 12/2018.  Repeat due, can be done outpatient.  TTE   -- Inpatient chem dep consult -- unable to help her given she is from [place].  They asked her to contact her health insurance to figure out local resources.   - Folic acid, MVI, thiamine  - TTE with hyperdynamic EF  - Albumin 50g on 11/13/2019, will do 100g on 11/14/2019 and 11/15/2019 given mild MARISSA and hypotension.  Check UA and FeNA  - Infectious workup with BC x 2, not enough ascites to tap, no resp sxs so will hold off on CXR.   MELD-Na score: 35 at 11/14/2019  4:26 AM  MELD score: 32 at 11/14/2019  4:26 AM  Calculated from:  Serum Creatinine: 1.18 mg/dL at 11/14/2019  4:26 AM  Serum Sodium: 126 mmol/L at 11/14/2019  4:26 AM  Total Bilirubin: 22.4 mg/dL at  11/14/2019  4:26 AM  INR(ratio): 2.99 at 11/14/2019  4:26 AM  Age: 44 years      # Anemia, stable -- monitor    # Nausea/Vomiting/Epigastric pain -- Trend Hb.  Start PPI.   Some gastritis/esophagitis on EGD in 12/2018.  AXR without obstruction       Diet: Combination Diet Regular Diet Adult  Room Service  Diet  Calorie Counts  Snacks/Supplements Adult: Boost Shake; Between Meals    DVT Prophylaxis: low platelet count. SCDs  Irby Catheter: not present  Code Status: Full Code      Disposition Plan   Expected discharge: 2-3 days, recommended to prior living arrangement once adequate pain management/ tolerating PO medications.  Entered: Jasmin Cat MD 11/14/2019, 3:34 PM       The patient's care was discussed with the Patient and Hepatology Consultant.    Jasmin Cat MD  Hospitalist Service, [address], [place]  Pager: [phone]  Please see sticky note for cross cover information  ______________________________________________________________________    Interval History   More nausea overnight.  Overall feeling unwell.  Some abdominal/epigastric discomfort.     Denies fever/chills, chest pain, shortness of breathing.       Data reviewed today: I reviewed all medications, new labs and imaging results over the last 24 hours. I personally reviewed no images or EKG's today.    Physical Exam   Vital Signs: Temp: 96.6  F (35.9  C) Temp src: Axillary BP: 122/50 Pulse: 102   Resp: 18 SpO2: 94 % O2 Device: None (Room air)    Weight: 200 lbs 1.6 oz     Lying in bed, ill appearing  CTAB on RA  RRR  Mild abd distention, mild tenderness  No LE edema  Sleepy but arousable, oriented to person/place/time but not quite to situation.  + asterixis.

## 2019-11-14 NOTE — PROGRESS NOTES
Sepsis Evaluation Progress Note    I was called to see Jessie Sanchez due to abnormal vital signs triggering the Sepsis SIRS screening alert. She is not known to have an infection.     Physical Exam   Vital Signs:  Temp: 97.1  F (36.2  C) Temp src: Oral BP: (!) 94/33 Pulse: 106   Resp: 16 SpO2: 94 % O2 Device: None (Room air)      Lab:  Lactic Acid   Date Value Ref Range Status   11/14/2019 2.3 (H) 0.7 - 2.0 mmol/L Final       The patient is at baseline mental status.     The rest of their physical exam is significant for sleepy but arousable, + asterixis.     Assessment & Plan   NO EVIDENCE OF SEPSIS at this time.  Vital sign, physical exam, and lab findings are likely due to alcohoolic hepatits.  Recheck LA after albumin.     Disposition: The patient will remain on the current unit. We will continue to monitor this patient closely.  Jasmin Cat MD

## 2019-11-15 ENCOUNTER — APPOINTMENT (OUTPATIENT)
Dept: GENERAL RADIOLOGY | Facility: CLINIC | Age: 44
DRG: 433 | End: 2019-11-15
Attending: STUDENT IN AN ORGANIZED HEALTH CARE EDUCATION/TRAINING PROGRAM
Payer: COMMERCIAL

## 2019-11-15 LAB
ABO + RH BLD: NORMAL
ABO + RH BLD: NORMAL
ALBUMIN SERPL-MCNC: 2.8 G/DL (ref 3.4–5)
ALP SERPL-CCNC: 139 U/L (ref 40–150)
ALT SERPL W P-5'-P-CCNC: 163 U/L (ref 0–50)
ANION GAP SERPL CALCULATED.3IONS-SCNC: 5 MMOL/L (ref 3–14)
AST SERPL W P-5'-P-CCNC: 160 U/L (ref 0–45)
BACTERIA SPEC CULT: ABNORMAL
BACTERIA SPEC CULT: ABNORMAL
BASOPHILS # BLD AUTO: 0 10E9/L (ref 0–0.2)
BASOPHILS # BLD AUTO: 0 10E9/L (ref 0–0.2)
BASOPHILS NFR BLD AUTO: 0.2 %
BASOPHILS NFR BLD AUTO: 0.3 %
BILIRUB SERPL-MCNC: 20.3 MG/DL (ref 0.2–1.3)
BLD PROD TYP BPU: NORMAL
BLD UNIT ID BPU: 0
BLOOD PRODUCT CODE: NORMAL
BPU ID: NORMAL
BUN SERPL-MCNC: 35 MG/DL (ref 7–30)
CALCIUM SERPL-MCNC: 8.8 MG/DL (ref 8.5–10.1)
CHLORIDE SERPL-SCNC: 93 MMOL/L (ref 94–109)
CO2 SERPL-SCNC: 28 MMOL/L (ref 20–32)
CREAT SERPL-MCNC: 1.29 MG/DL (ref 0.52–1.04)
DIFFERENTIAL METHOD BLD: ABNORMAL
DIFFERENTIAL METHOD BLD: ABNORMAL
EBV DNA SPEC QL NAA+PROBE: NOT DETECTED
EOSINOPHIL # BLD AUTO: 0.3 10E9/L (ref 0–0.7)
EOSINOPHIL # BLD AUTO: 0.6 10E9/L (ref 0–0.7)
EOSINOPHIL NFR BLD AUTO: 3.6 %
EOSINOPHIL NFR BLD AUTO: 6.3 %
ERYTHROCYTE [DISTWIDTH] IN BLOOD BY AUTOMATED COUNT: 22.6 % (ref 10–15)
ERYTHROCYTE [DISTWIDTH] IN BLOOD BY AUTOMATED COUNT: 23.2 % (ref 10–15)
FIBRINOGEN PPP-MCNC: 76 MG/DL (ref 200–420)
FIBRINOGEN PPP-MCNC: 95 MG/DL (ref 200–420)
GFR SERPL CREATININE-BSD FRML MDRD: 50 ML/MIN/{1.73_M2}
GLUCOSE SERPL-MCNC: 90 MG/DL (ref 70–99)
HCT VFR BLD AUTO: 22.3 % (ref 35–47)
HCT VFR BLD AUTO: 24.5 % (ref 35–47)
HGB BLD-MCNC: 7.4 G/DL (ref 11.7–15.7)
HGB BLD-MCNC: 8.4 G/DL (ref 11.7–15.7)
IMM GRANULOCYTES # BLD: 0.1 10E9/L (ref 0–0.4)
IMM GRANULOCYTES # BLD: 0.2 10E9/L (ref 0–0.4)
IMM GRANULOCYTES NFR BLD: 1.3 %
IMM GRANULOCYTES NFR BLD: 1.9 %
INR PPP: 3.92 (ref 0.86–1.14)
INR PPP: 4.32 (ref 0.86–1.14)
LACTATE BLD-SCNC: 2.2 MMOL/L (ref 0.7–2)
LYMPHOCYTES # BLD AUTO: 1 10E9/L (ref 0.8–5.3)
LYMPHOCYTES # BLD AUTO: 2 10E9/L (ref 0.8–5.3)
LYMPHOCYTES NFR BLD AUTO: 12.2 %
LYMPHOCYTES NFR BLD AUTO: 19.8 %
Lab: ABNORMAL
MCH RBC QN AUTO: 27.8 PG (ref 26.5–33)
MCH RBC QN AUTO: 27.8 PG (ref 26.5–33)
MCHC RBC AUTO-ENTMCNC: 33.2 G/DL (ref 31.5–36.5)
MCHC RBC AUTO-ENTMCNC: 34.3 G/DL (ref 31.5–36.5)
MCV RBC AUTO: 81 FL (ref 78–100)
MCV RBC AUTO: 84 FL (ref 78–100)
MONOCYTES # BLD AUTO: 0.9 10E9/L (ref 0–1.3)
MONOCYTES # BLD AUTO: 1.4 10E9/L (ref 0–1.3)
MONOCYTES NFR BLD AUTO: 10.8 %
MONOCYTES NFR BLD AUTO: 13.8 %
NEUTROPHILS # BLD AUTO: 5.7 10E9/L (ref 1.6–8.3)
NEUTROPHILS # BLD AUTO: 5.8 10E9/L (ref 1.6–8.3)
NEUTROPHILS NFR BLD AUTO: 58 %
NEUTROPHILS NFR BLD AUTO: 71.8 %
NRBC # BLD AUTO: 0 10*3/UL
NRBC # BLD AUTO: 0.1 10*3/UL
NRBC BLD AUTO-RTO: 0 /100
NRBC BLD AUTO-RTO: 1 /100
NUM BPU REQUESTED: 1
NUM BPU REQUESTED: 10
PLATELET # BLD AUTO: 70 10E9/L (ref 150–450)
PLATELET # BLD AUTO: 72 10E9/L (ref 150–450)
POTASSIUM SERPL-SCNC: 3.8 MMOL/L (ref 3.4–5.3)
PROT SERPL-MCNC: 4.8 G/DL (ref 6.8–8.8)
RBC # BLD AUTO: 2.66 10E12/L (ref 3.8–5.2)
RBC # BLD AUTO: 3.02 10E12/L (ref 3.8–5.2)
SODIUM SERPL-SCNC: 126 MMOL/L (ref 133–144)
SPECIMEN EXP DATE BLD: NORMAL
SPECIMEN SOURCE: ABNORMAL
SPECIMEN SOURCE: NORMAL
TRANSFUSION STATUS PATIENT QL: NORMAL
WBC # BLD AUTO: 8 10E9/L (ref 4–11)
WBC # BLD AUTO: 9.9 10E9/L (ref 4–11)

## 2019-11-15 PROCEDURE — 86900 BLOOD TYPING SEROLOGIC ABO: CPT | Performed by: INTERNAL MEDICINE

## 2019-11-15 PROCEDURE — 85025 COMPLETE CBC W/AUTO DIFF WBC: CPT | Performed by: STUDENT IN AN ORGANIZED HEALTH CARE EDUCATION/TRAINING PROGRAM

## 2019-11-15 PROCEDURE — 40000802 ZZH SITE CHECK

## 2019-11-15 PROCEDURE — 80053 COMPREHEN METABOLIC PANEL: CPT | Performed by: STUDENT IN AN ORGANIZED HEALTH CARE EDUCATION/TRAINING PROGRAM

## 2019-11-15 PROCEDURE — 99233 SBSQ HOSP IP/OBS HIGH 50: CPT | Performed by: STUDENT IN AN ORGANIZED HEALTH CARE EDUCATION/TRAINING PROGRAM

## 2019-11-15 PROCEDURE — 25000132 ZZH RX MED GY IP 250 OP 250 PS 637: Performed by: NURSE PRACTITIONER

## 2019-11-15 PROCEDURE — 85384 FIBRINOGEN ACTIVITY: CPT | Performed by: STUDENT IN AN ORGANIZED HEALTH CARE EDUCATION/TRAINING PROGRAM

## 2019-11-15 PROCEDURE — 99207 ZZC NO CHARGE LOS: CPT | Performed by: STUDENT IN AN ORGANIZED HEALTH CARE EDUCATION/TRAINING PROGRAM

## 2019-11-15 PROCEDURE — 71046 X-RAY EXAM CHEST 2 VIEWS: CPT

## 2019-11-15 PROCEDURE — 71045 X-RAY EXAM CHEST 1 VIEW: CPT | Mod: XS

## 2019-11-15 PROCEDURE — 25000132 ZZH RX MED GY IP 250 OP 250 PS 637: Performed by: STUDENT IN AN ORGANIZED HEALTH CARE EDUCATION/TRAINING PROGRAM

## 2019-11-15 PROCEDURE — 36415 COLL VENOUS BLD VENIPUNCTURE: CPT | Performed by: STUDENT IN AN ORGANIZED HEALTH CARE EDUCATION/TRAINING PROGRAM

## 2019-11-15 PROCEDURE — 40000141 ZZH STATISTIC PERIPHERAL IV START W/O US GUIDANCE

## 2019-11-15 PROCEDURE — 25000125 ZZHC RX 250: Performed by: STUDENT IN AN ORGANIZED HEALTH CARE EDUCATION/TRAINING PROGRAM

## 2019-11-15 PROCEDURE — 85610 PROTHROMBIN TIME: CPT | Performed by: STUDENT IN AN ORGANIZED HEALTH CARE EDUCATION/TRAINING PROGRAM

## 2019-11-15 PROCEDURE — 40000344 ZZHCL STATISTIC THAWING COMPONENT: Performed by: STUDENT IN AN ORGANIZED HEALTH CARE EDUCATION/TRAINING PROGRAM

## 2019-11-15 PROCEDURE — 25000128 H RX IP 250 OP 636: Performed by: STUDENT IN AN ORGANIZED HEALTH CARE EDUCATION/TRAINING PROGRAM

## 2019-11-15 PROCEDURE — P9059 PLASMA, FRZ BETWEEN 8-24HOUR: HCPCS | Performed by: STUDENT IN AN ORGANIZED HEALTH CARE EDUCATION/TRAINING PROGRAM

## 2019-11-15 PROCEDURE — P9047 ALBUMIN (HUMAN), 25%, 50ML: HCPCS | Performed by: STUDENT IN AN ORGANIZED HEALTH CARE EDUCATION/TRAINING PROGRAM

## 2019-11-15 PROCEDURE — 83605 ASSAY OF LACTIC ACID: CPT | Performed by: STUDENT IN AN ORGANIZED HEALTH CARE EDUCATION/TRAINING PROGRAM

## 2019-11-15 PROCEDURE — P9012 CRYOPRECIPITATE EACH UNIT: HCPCS | Performed by: STUDENT IN AN ORGANIZED HEALTH CARE EDUCATION/TRAINING PROGRAM

## 2019-11-15 PROCEDURE — 12000001 ZZH R&B MED SURG/OB UMMC

## 2019-11-15 RX ORDER — CEFTRIAXONE 1 G/1
1 INJECTION, POWDER, FOR SOLUTION INTRAMUSCULAR; INTRAVENOUS EVERY 24 HOURS
Status: DISCONTINUED | OUTPATIENT
Start: 2019-11-15 | End: 2019-11-18

## 2019-11-15 RX ORDER — PHYTONADIONE 5 MG/1
10 TABLET ORAL DAILY
Status: DISCONTINUED | OUTPATIENT
Start: 2019-11-15 | End: 2019-11-15 | Stop reason: CLARIF

## 2019-11-15 RX ADMIN — RIFAXIMIN 550 MG: 550 TABLET ORAL at 08:37

## 2019-11-15 RX ADMIN — PHYTONADIONE 10 MG: 10 INJECTION, EMULSION INTRAMUSCULAR; INTRAVENOUS; SUBCUTANEOUS at 11:19

## 2019-11-15 RX ADMIN — HYDROMORPHONE HYDROCHLORIDE 4 MG: 2 TABLET ORAL at 19:20

## 2019-11-15 RX ADMIN — HYDROMORPHONE HYDROCHLORIDE 4 MG: 2 TABLET ORAL at 12:47

## 2019-11-15 RX ADMIN — LACTULOSE 30 ML: 20 SOLUTION ORAL at 08:37

## 2019-11-15 RX ADMIN — PANTOPRAZOLE SODIUM 40 MG: 40 TABLET, DELAYED RELEASE ORAL at 19:20

## 2019-11-15 RX ADMIN — ALBUMIN HUMAN 100 G: 0.25 SOLUTION INTRAVENOUS at 08:35

## 2019-11-15 RX ADMIN — LACTULOSE 30 ML: 20 SOLUTION ORAL at 20:29

## 2019-11-15 RX ADMIN — PANTOPRAZOLE SODIUM 40 MG: 40 TABLET, DELAYED RELEASE ORAL at 08:36

## 2019-11-15 RX ADMIN — GABAPENTIN 300 MG: 300 CAPSULE ORAL at 20:28

## 2019-11-15 RX ADMIN — HYDROMORPHONE HYDROCHLORIDE 4 MG: 2 TABLET ORAL at 09:26

## 2019-11-15 RX ADMIN — CEFTRIAXONE 1 G: 1 INJECTION, POWDER, FOR SOLUTION INTRAMUSCULAR; INTRAVENOUS at 11:19

## 2019-11-15 RX ADMIN — HYDROMORPHONE HYDROCHLORIDE 4 MG: 2 TABLET ORAL at 22:52

## 2019-11-15 RX ADMIN — GABAPENTIN 300 MG: 300 CAPSULE ORAL at 08:36

## 2019-11-15 RX ADMIN — THERA TABS 1 TABLET: TAB at 08:37

## 2019-11-15 RX ADMIN — LACTULOSE 20 G: 20 SOLUTION ORAL at 02:27

## 2019-11-15 RX ADMIN — GABAPENTIN 300 MG: 300 CAPSULE ORAL at 13:46

## 2019-11-15 RX ADMIN — RIFAXIMIN 550 MG: 550 TABLET ORAL at 20:28

## 2019-11-15 RX ADMIN — HYDROMORPHONE HYDROCHLORIDE 4 MG: 2 TABLET ORAL at 15:46

## 2019-11-15 ASSESSMENT — MIFFLIN-ST. JEOR: SCORE: 1624.29

## 2019-11-15 ASSESSMENT — ACTIVITIES OF DAILY LIVING (ADL)
ADLS_ACUITY_SCORE: 11
ADLS_ACUITY_SCORE: 11
ADLS_ACUITY_SCORE: 10
ADLS_ACUITY_SCORE: 10
ADLS_ACUITY_SCORE: 11
ADLS_ACUITY_SCORE: 11
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
## 2019-11-15 NOTE — PROGRESS NOTES
BRIEF GI NOTE:    43 yo admitted with severe alcoholic hepatitis with possible acute (vs. Chronic) hepatitis C on alcoholic cirrhosis, course complicated with hypotension and MARISSA. 11/14 Blood pressure remains soft, Cr remains elevated at 1.18 (prior baseline ~ 0.7). LFTs stable, MELD-Na 35.     Recommendadtions:  -- Complete infectious evaluation including diagnostic para (if able), CXR, 2 sets of blood cultures, UA/UCx, stool studies if diarrhea  -- Send renal UA, FeNa  -- Albumin challenge, 100g daily x3 days  -- Hold diuretics  -- Daily MELD labs  -- Continue PPI  -- Avoid nephrotoxic medications  -- Nutrition consult if not already, encourage high protein, low sodium diet (2gm Na)  -- Follow-up with hepatology at [facility] to discuss possible treatment for hepatitis C once discharged    Discussed with Dr. Segovia.    Blanca Tavarez MD  GI Fellow  P: [phone]

## 2019-11-15 NOTE — PLAN OF CARE
VSS on RA except slightly tachy. Encouraged to ambulate, pt up in halls with spouse x2 this shift. Pt very sleepy/lethargic this afternoon, arousable with touch. Pt alert when awake but a bit confused at times. Able to say year but not specific date, able to say [place] but not in a hospital. Given an extra dose of lactulose. Lactate came back as 2.0. Pt tolerating regular diet with good appetite. UA/UC collected and sent. Encourage activity and nutrition as tolerated. Monitor for and changes in status.

## 2019-11-15 NOTE — PLAN OF CARE
Reason for admission: Hepatitis  Status: Pt has increased lethargy, would only arouse to gentle shaking.  present in room.   VS: BP runs slightly low at 90s-100s/30s. Afebrile.   Pain: No c/o pain.    Neuro: Disoriented to place and situation overnight, however pt very sleepy. PRN dose of lactulose administered.   Respiratory: Breathing adequately on RA. LS clear/equal bilaterally.   Cardiac: Intermittently tachycardic in low 100s.   Skin: Jaundiced.   IV/Drains: PIV SL'd.   GI/: One formed BM this AM. Voiding appropriately, deborah urine. Strict I/O monitoring.   Diet: Regular diet. On calorie counts starting today. Denies nausea.   Activity: Up independently in room.   Plan of care: Monitor mental status, administer PRN doses of lactulose. Encourage ambulation. SW to get in contact w/ pt's  today, oncoming RN updated about this. Continue to monitor and follow POC.

## 2019-11-15 NOTE — PROGRESS NOTES
Noticed blood in pt mouth. unable to find source. Denies pain. Gold 8 paged.     Jose came to assess pt. Fibrinogen ordered and consent signs for possible future blood transfusions.

## 2019-11-15 NOTE — PLAN OF CARE
Pt A&O x4 with forgetfulness. Letharargic for most of evening. Chest x-ray complete. Fair appetite with some nausea with eating. Offered antiemetics. Pt denied. BM x2 hard and formed per pt. C/o pain to abdomen. Given dilaudid x2 with some relief. Ls clear bilaterally. Strict I&O's. Up independently in room. Noticed pt drooling bloody sputum. Fibrinogen 76. Cryopreipitate and plasma ordered. 1 cryo released and awaiting delivery. 20 G IV placed. Will continue to monitor.

## 2019-11-15 NOTE — PROVIDER NOTIFICATION
"Provider pager #[phone] notified w/ this message:  \"Pt's lactic yesterday 2.3 - recheck at 1456 yesterday 2.0.  Would you like another lactic check?\"  Will continue to monitor & follow POC.     Bryon HERNANDEZ returned page - stated as long as there is no significant change in VS, there is no need for lactic repeat. Will follow POC.   "

## 2019-11-15 NOTE — PROGRESS NOTES
[facility], [place]    Medicine Progress Note - Hospitalist Service, Gold 8       Date of Admission:  11/10/2019  Assessment & Plan   Jessie Sanchez is a 44 year old female admitted on 11/10/2019. She has a history of alcohol abuse, cirrhosis and diastolic heart failure and transferred to our facility from an outside hospital where she has been admitted for hepatitis from 11/1/2019    TODAY  - Ceftriaxone for possible UTI  - Trend CBC, INR, and Fib BID with transfusion goal of Hb < 7, Plt < 20, Fib < 100, and INR > 1.8 with active bleeding.   Giving 10U Cryo and 1U FFP this afternoon  - Continue lactulose and rifaximin  - Infectious workup  - Hold diuretics. Albumin challenge  - MELD labs stable  - Calorie counts --> push diet  - Ambulate  - CXR to r/o pleural effusion    # New HCV infection  # Alcoholic hepatitis  # Hx of alcoholic cirrhosis with acute decompensation  # Hyponatremia, coagulopathy, thrmobocytopenia  # MARISSA -- pre-renal vs. hepatoreanl  - Was treated with steroids which has been stopped due to increased lille score. Hep C RNA showed 3.8 million viral count. US without clot.   Hepatology was consulted and recommended intensive outpatient chem dep treatment for alcohol cessation (last drink 2 months ago) and outpatient hepatology follow-up to start HCV treatment.   - HE: Continue lactulose, start rifaximin  - Ascites/edema: Hold lasix, spironolactone.  PRN IVFs given hypotension.   -- EV: None on EGD in 12/2018.  Repeat due, can be done outpatient.  TTE   -- Inpatient chem dep consult -- unable to help her given she is from [place].  They asked her to contact her health insurance to figure out local resources.   - Folic acid, MVI, thiamine  - TTE with hyperdynamic EF  - VIt k 10mg for 3 days  - Albumin 50g on 11/13/2019, will do 100g on 11/14/2019 and 11/15/2019 given mild MARISSA and hypotension.  Check UA and FeNA  - Infectious workup with BC x 2, not enough ascites to  tap, no resp sxs so will hold off on CXR.   MELD-Na score: 38 at 11/15/2019  5:36 AM  MELD score: 37 at 11/15/2019  5:36 AM  Calculated from:  Serum Creatinine: 1.29 mg/dL at 11/15/2019  5:36 AM  Serum Sodium: 126 mmol/L at 11/15/2019  5:36 AM  Total Bilirubin: 20.3 mg/dL at 11/15/2019  5:36 AM  INR(ratio): 4.32 at 11/15/2019  5:36 AM  Age: 44 years    # Concern for UTI vs. Asymptomatic bacteruria  - Follow culture  - Given decompensated cirrhosis, will treat with ceftriaxone  - Await cultures    # Gum bleeding  - Concern for DIC  - Replace FFP and cryo  - Trend CBC, INR, and Fib BID with transfusion goal of Hb < 7, Plt < 20, Fib < 100, and INR > 1.8 with active bleeding.     # Anemia, stable -- monitor    # Nausea/Vomiting/Epigastric pain -- Trend Hb.  Start PPI.   Some gastritis/esophagitis on EGD in 12/2018.  AXR without obstruction       Diet: Combination Diet Regular Diet Adult  Room Service  Diet  Calorie Counts  Snacks/Supplements Adult: Boost Shake; Between Meals    DVT Prophylaxis: low platelet count. SCDs  Irby Catheter: not present  Code Status: Full Code      Disposition Plan   Expected discharge: 2-3 days, recommended to prior living arrangement once adequate pain management/ tolerating PO medications.  Entered: Jasmin Cat MD 11/15/2019, 1:39 PM       The patient's care was discussed with the Patient and Hepatology Consultant.    Jasmin Cat MD  Hospitalist Service, [address], [place]  Pager: [phone]  Please see sticky note for cross cover information  ______________________________________________________________________    Interval History   More nausea overnight.  Overall feeling unwell.  Some abdominal/epigastric discomfort.    Denies fever/chills, chest pain, shortness of breathing, dysuria, cough, rash, joint pain.     Later this AM, some gum bleeding noted by RN.  No vomiting/bloody stool or hematuria or vaginal bleeding or hemoptysis    Data reviewed  today: I reviewed all medications, new labs and imaging results over the last 24 hours. I personally reviewed no images or EKG's today.    Physical Exam   Vital Signs: Temp: 97.1  F (36.2  C) Temp src: Oral BP: (!) 113/38 Pulse: 100   Resp: 18 SpO2: 91 % O2 Device: None (Room air)    Weight: 202 lbs 0 oz     Lying in bed, ill appearing  Some gum bleeding.   CTAB on RA  RRR  Mild abd distention, mild tenderness  No LE edema  Sleepy but arousable, oriented to person/place/time but not quite to situation.  + asterixis.

## 2019-11-16 LAB
ALBUMIN SERPL-MCNC: 3.4 G/DL (ref 3.4–5)
ALP SERPL-CCNC: 129 U/L (ref 40–150)
ALT SERPL W P-5'-P-CCNC: 119 U/L (ref 0–50)
ANION GAP SERPL CALCULATED.3IONS-SCNC: 8 MMOL/L (ref 3–14)
APTT PPP: 69 SEC (ref 22–37)
AST SERPL W P-5'-P-CCNC: 114 U/L (ref 0–45)
BASOPHILS # BLD AUTO: 0 10E9/L (ref 0–0.2)
BASOPHILS NFR BLD AUTO: 0.1 %
BASOPHILS NFR BLD AUTO: 0.2 %
BASOPHILS NFR BLD AUTO: 0.2 %
BILIRUB SERPL-MCNC: 19.1 MG/DL (ref 0.2–1.3)
BUN SERPL-MCNC: 37 MG/DL (ref 7–30)
CALCIUM SERPL-MCNC: 9.2 MG/DL (ref 8.5–10.1)
CHLORIDE SERPL-SCNC: 96 MMOL/L (ref 94–109)
CO2 SERPL-SCNC: 26 MMOL/L (ref 20–32)
CREAT SERPL-MCNC: 1.09 MG/DL (ref 0.52–1.04)
DIFFERENTIAL METHOD BLD: ABNORMAL
EOSINOPHIL # BLD AUTO: 0.4 10E9/L (ref 0–0.7)
EOSINOPHIL # BLD AUTO: 0.4 10E9/L (ref 0–0.7)
EOSINOPHIL # BLD AUTO: 0.5 10E9/L (ref 0–0.7)
EOSINOPHIL NFR BLD AUTO: 2.5 %
EOSINOPHIL NFR BLD AUTO: 2.8 %
EOSINOPHIL NFR BLD AUTO: 3.6 %
ERYTHROCYTE [DISTWIDTH] IN BLOOD BY AUTOMATED COUNT: 23.8 % (ref 10–15)
ERYTHROCYTE [DISTWIDTH] IN BLOOD BY AUTOMATED COUNT: 23.9 % (ref 10–15)
ERYTHROCYTE [DISTWIDTH] IN BLOOD BY AUTOMATED COUNT: 24.2 % (ref 10–15)
FIBRINOGEN PPP-MCNC: 129 MG/DL (ref 200–420)
FIBRINOGEN PPP-MCNC: 129 MG/DL (ref 200–420)
GFR SERPL CREATININE-BSD FRML MDRD: 61 ML/MIN/{1.73_M2}
GLUCOSE SERPL-MCNC: 74 MG/DL (ref 70–99)
HCT VFR BLD AUTO: 22.7 % (ref 35–47)
HCT VFR BLD AUTO: 22.9 % (ref 35–47)
HCT VFR BLD AUTO: 23.6 % (ref 35–47)
HGB BLD-MCNC: 7.5 G/DL (ref 11.7–15.7)
HGB BLD-MCNC: 7.6 G/DL (ref 11.7–15.7)
HGB BLD-MCNC: 7.9 G/DL (ref 11.7–15.7)
IMM GRANULOCYTES # BLD: 0.2 10E9/L (ref 0–0.4)
IMM GRANULOCYTES NFR BLD: 1.3 %
IMM GRANULOCYTES NFR BLD: 1.6 %
IMM GRANULOCYTES NFR BLD: 1.7 %
INR PPP: 3.25 (ref 0.86–1.14)
INR PPP: 3.56 (ref 0.86–1.14)
LACTATE BLD-SCNC: 1.9 MMOL/L (ref 0.7–2)
LDH SERPL L TO P-CCNC: 220 U/L (ref 81–234)
LYMPHOCYTES # BLD AUTO: 1 10E9/L (ref 0.8–5.3)
LYMPHOCYTES # BLD AUTO: 1.2 10E9/L (ref 0.8–5.3)
LYMPHOCYTES # BLD AUTO: 1.2 10E9/L (ref 0.8–5.3)
LYMPHOCYTES NFR BLD AUTO: 7.5 %
LYMPHOCYTES NFR BLD AUTO: 9.3 %
LYMPHOCYTES NFR BLD AUTO: 9.6 %
MCH RBC QN AUTO: 27.7 PG (ref 26.5–33)
MCH RBC QN AUTO: 28.1 PG (ref 26.5–33)
MCH RBC QN AUTO: 28.5 PG (ref 26.5–33)
MCHC RBC AUTO-ENTMCNC: 32.8 G/DL (ref 31.5–36.5)
MCHC RBC AUTO-ENTMCNC: 33.5 G/DL (ref 31.5–36.5)
MCHC RBC AUTO-ENTMCNC: 33.5 G/DL (ref 31.5–36.5)
MCV RBC AUTO: 84 FL (ref 78–100)
MCV RBC AUTO: 85 FL (ref 78–100)
MCV RBC AUTO: 85 FL (ref 78–100)
MONOCYTES # BLD AUTO: 1.3 10E9/L (ref 0–1.3)
MONOCYTES # BLD AUTO: 1.4 10E9/L (ref 0–1.3)
MONOCYTES # BLD AUTO: 1.4 10E9/L (ref 0–1.3)
MONOCYTES NFR BLD AUTO: 10.1 %
MONOCYTES NFR BLD AUTO: 10.7 %
MONOCYTES NFR BLD AUTO: 10.9 %
NEUTROPHILS # BLD AUTO: 10.8 10E9/L (ref 1.6–8.3)
NEUTROPHILS # BLD AUTO: 9.3 10E9/L (ref 1.6–8.3)
NEUTROPHILS # BLD AUTO: 9.6 10E9/L (ref 1.6–8.3)
NEUTROPHILS NFR BLD AUTO: 74 %
NEUTROPHILS NFR BLD AUTO: 75.7 %
NEUTROPHILS NFR BLD AUTO: 78.2 %
NRBC # BLD AUTO: 0 10*3/UL
NRBC # BLD AUTO: 0 10*3/UL
NRBC # BLD AUTO: 0.1 10*3/UL
NRBC BLD AUTO-RTO: 0 /100
PLATELET # BLD AUTO: 55 10E9/L (ref 150–450)
PLATELET # BLD AUTO: 56 10E9/L (ref 150–450)
PLATELET # BLD AUTO: 58 10E9/L (ref 150–450)
PLATELET # BLD EST: ABNORMAL 10*3/UL
PLATELET # BLD EST: ABNORMAL 10*3/UL
POTASSIUM SERPL-SCNC: 4 MMOL/L (ref 3.4–5.3)
PROT SERPL-MCNC: 5.5 G/DL (ref 6.8–8.8)
RBC # BLD AUTO: 2.7 10E12/L (ref 3.8–5.2)
RBC # BLD AUTO: 2.71 10E12/L (ref 3.8–5.2)
RBC # BLD AUTO: 2.77 10E12/L (ref 3.8–5.2)
RETICS # AUTO: 133.5 10E9/L (ref 25–95)
RETICS/RBC NFR AUTO: 4.8 % (ref 0.5–2)
SODIUM SERPL-SCNC: 130 MMOL/L (ref 133–144)
WBC # BLD AUTO: 12.3 10E9/L (ref 4–11)
WBC # BLD AUTO: 13 10E9/L (ref 4–11)
WBC # BLD AUTO: 13.8 10E9/L (ref 4–11)

## 2019-11-16 PROCEDURE — 85610 PROTHROMBIN TIME: CPT | Performed by: STUDENT IN AN ORGANIZED HEALTH CARE EDUCATION/TRAINING PROGRAM

## 2019-11-16 PROCEDURE — 85045 AUTOMATED RETICULOCYTE COUNT: CPT | Performed by: STUDENT IN AN ORGANIZED HEALTH CARE EDUCATION/TRAINING PROGRAM

## 2019-11-16 PROCEDURE — 25000125 ZZHC RX 250: Performed by: STUDENT IN AN ORGANIZED HEALTH CARE EDUCATION/TRAINING PROGRAM

## 2019-11-16 PROCEDURE — 40000611 ZZHCL STATISTIC MORPHOLOGY W/INTERP HEMEPATH TC 85060: Performed by: STUDENT IN AN ORGANIZED HEALTH CARE EDUCATION/TRAINING PROGRAM

## 2019-11-16 PROCEDURE — 83615 LACTATE (LD) (LDH) ENZYME: CPT | Performed by: STUDENT IN AN ORGANIZED HEALTH CARE EDUCATION/TRAINING PROGRAM

## 2019-11-16 PROCEDURE — 12000001 ZZH R&B MED SURG/OB UMMC

## 2019-11-16 PROCEDURE — 85384 FIBRINOGEN ACTIVITY: CPT | Performed by: STUDENT IN AN ORGANIZED HEALTH CARE EDUCATION/TRAINING PROGRAM

## 2019-11-16 PROCEDURE — P9047 ALBUMIN (HUMAN), 25%, 50ML: HCPCS | Performed by: STUDENT IN AN ORGANIZED HEALTH CARE EDUCATION/TRAINING PROGRAM

## 2019-11-16 PROCEDURE — 99233 SBSQ HOSP IP/OBS HIGH 50: CPT | Performed by: STUDENT IN AN ORGANIZED HEALTH CARE EDUCATION/TRAINING PROGRAM

## 2019-11-16 PROCEDURE — 80053 COMPREHEN METABOLIC PANEL: CPT | Performed by: STUDENT IN AN ORGANIZED HEALTH CARE EDUCATION/TRAINING PROGRAM

## 2019-11-16 PROCEDURE — 85730 THROMBOPLASTIN TIME PARTIAL: CPT | Performed by: STUDENT IN AN ORGANIZED HEALTH CARE EDUCATION/TRAINING PROGRAM

## 2019-11-16 PROCEDURE — 85025 COMPLETE CBC W/AUTO DIFF WBC: CPT | Performed by: STUDENT IN AN ORGANIZED HEALTH CARE EDUCATION/TRAINING PROGRAM

## 2019-11-16 PROCEDURE — 83010 ASSAY OF HAPTOGLOBIN QUANT: CPT | Performed by: STUDENT IN AN ORGANIZED HEALTH CARE EDUCATION/TRAINING PROGRAM

## 2019-11-16 PROCEDURE — 99207 ZZC NO CHARGE LOS: CPT | Performed by: STUDENT IN AN ORGANIZED HEALTH CARE EDUCATION/TRAINING PROGRAM

## 2019-11-16 PROCEDURE — 36415 COLL VENOUS BLD VENIPUNCTURE: CPT | Performed by: STUDENT IN AN ORGANIZED HEALTH CARE EDUCATION/TRAINING PROGRAM

## 2019-11-16 PROCEDURE — 83605 ASSAY OF LACTIC ACID: CPT | Performed by: STUDENT IN AN ORGANIZED HEALTH CARE EDUCATION/TRAINING PROGRAM

## 2019-11-16 PROCEDURE — 25000132 ZZH RX MED GY IP 250 OP 250 PS 637: Performed by: STUDENT IN AN ORGANIZED HEALTH CARE EDUCATION/TRAINING PROGRAM

## 2019-11-16 PROCEDURE — 25000132 ZZH RX MED GY IP 250 OP 250 PS 637: Performed by: NURSE PRACTITIONER

## 2019-11-16 PROCEDURE — 25000128 H RX IP 250 OP 636: Performed by: STUDENT IN AN ORGANIZED HEALTH CARE EDUCATION/TRAINING PROGRAM

## 2019-11-16 RX ORDER — ALBUMIN (HUMAN) 12.5 G/50ML
50 SOLUTION INTRAVENOUS ONCE
Status: DISCONTINUED | OUTPATIENT
Start: 2019-11-16 | End: 2019-11-16

## 2019-11-16 RX ORDER — NICOTINE POLACRILEX 4 MG
15-30 LOZENGE BUCCAL
Status: DISCONTINUED | OUTPATIENT
Start: 2019-11-16 | End: 2019-11-19 | Stop reason: HOSPADM

## 2019-11-16 RX ORDER — ALBUMIN (HUMAN) 12.5 G/50ML
50 SOLUTION INTRAVENOUS ONCE
Status: COMPLETED | OUTPATIENT
Start: 2019-11-16 | End: 2019-11-16

## 2019-11-16 RX ORDER — DEXTROSE MONOHYDRATE 25 G/50ML
25-50 INJECTION, SOLUTION INTRAVENOUS
Status: DISCONTINUED | OUTPATIENT
Start: 2019-11-16 | End: 2019-11-19 | Stop reason: HOSPADM

## 2019-11-16 RX ADMIN — HYDROMORPHONE HYDROCHLORIDE 4 MG: 2 TABLET ORAL at 07:39

## 2019-11-16 RX ADMIN — RIFAXIMIN 550 MG: 550 TABLET ORAL at 07:39

## 2019-11-16 RX ADMIN — PANTOPRAZOLE SODIUM 40 MG: 40 TABLET, DELAYED RELEASE ORAL at 15:31

## 2019-11-16 RX ADMIN — GABAPENTIN 300 MG: 300 CAPSULE ORAL at 15:31

## 2019-11-16 RX ADMIN — PANTOPRAZOLE SODIUM 40 MG: 40 TABLET, DELAYED RELEASE ORAL at 07:39

## 2019-11-16 RX ADMIN — RIFAXIMIN 550 MG: 550 TABLET ORAL at 20:01

## 2019-11-16 RX ADMIN — HYDROMORPHONE HYDROCHLORIDE 4 MG: 2 TABLET ORAL at 10:56

## 2019-11-16 RX ADMIN — GABAPENTIN 300 MG: 300 CAPSULE ORAL at 07:39

## 2019-11-16 RX ADMIN — LACTULOSE 30 ML: 20 SOLUTION ORAL at 20:01

## 2019-11-16 RX ADMIN — LACTULOSE 30 ML: 20 SOLUTION ORAL at 07:39

## 2019-11-16 RX ADMIN — ALBUMIN HUMAN 50 G: 0.25 SOLUTION INTRAVENOUS at 15:32

## 2019-11-16 RX ADMIN — CEFTRIAXONE 1 G: 1 INJECTION, POWDER, FOR SOLUTION INTRAMUSCULAR; INTRAVENOUS at 10:56

## 2019-11-16 RX ADMIN — PHYTONADIONE 10 MG: 10 INJECTION, EMULSION INTRAMUSCULAR; INTRAVENOUS; SUBCUTANEOUS at 07:39

## 2019-11-16 RX ADMIN — THERA TABS 1 TABLET: TAB at 07:39

## 2019-11-16 RX ADMIN — GABAPENTIN 300 MG: 300 CAPSULE ORAL at 20:01

## 2019-11-16 RX ADMIN — GABAPENTIN 300 MG: 300 CAPSULE ORAL at 22:17

## 2019-11-16 RX ADMIN — HYDROMORPHONE HYDROCHLORIDE 4 MG: 2 TABLET ORAL at 18:30

## 2019-11-16 RX ADMIN — HYDROMORPHONE HYDROCHLORIDE 4 MG: 2 TABLET ORAL at 15:31

## 2019-11-16 RX ADMIN — HYDROMORPHONE HYDROCHLORIDE 4 MG: 2 TABLET ORAL at 22:17

## 2019-11-16 ASSESSMENT — ACTIVITIES OF DAILY LIVING (ADL)
ADLS_ACUITY_SCORE: 13
ADLS_ACUITY_SCORE: 13
ADLS_ACUITY_SCORE: 10
ADLS_ACUITY_SCORE: 13
ADLS_ACUITY_SCORE: 13
ADLS_ACUITY_SCORE: 10

## 2019-11-16 ASSESSMENT — MIFFLIN-ST. JEOR: SCORE: 1622.93

## 2019-11-16 NOTE — PROGRESS NOTES
Calorie Count  Intake recorded for: 11/15  Total Kcals: 1812 Total Protein: 66g  Kcals from Hospital Food: 1650  Protein: 66g  Kcals from Outside Food (average):162 Protein: 0g  # Meals Recorded: 2 meals (First -100% milk, fruit ice, 50% breakfast sandwich w/ sausage, egg, cheese)      (Second - 100% chicken noodle soup, fruit cup, fruit ice, 50% pepperoni pizza, handful of skittles)  # Supplements Recorded: 100% 2 Boost Plus

## 2019-11-16 NOTE — PLAN OF CARE
"BP (!) 105/37 (BP Location: Right arm)   Pulse 104   Temp 96.2  F (35.7  C) (Oral)   Resp 20   Ht 1.727 m (5' 8\")   Wt 92.6 kg (204 lb 1.6 oz)   SpO2 96%   BMI 31.03 kg/m      Care from: 5119-4271      VS & Pain: VSS ex hypertensive, tachycardic, on 2 LPM nc overnight, denied pain    Neuro: A&O x4, forgetful and sleepy    Respiratory: WDL    Cardiac: tachycardic    Peripheral neurovascular: WDL, no numbness and tingling, no edema    GI/: WDL ex abdominal discomfort, denied nausea and vomiting, no BM this shift, unmeasured urine occurrence x1    Nutrition: good appetite    Skin: WDL ex yellow, PIVs     Musculoskeletal: WDL    Lines: PIVs are WDL and saline locked    Activity: independent    Events this shift: Pt slept well through the night. Administered all scheduled medications. No prn pain meds was requested by pt this shift.    Plan: Continue to monitor and follow poc. Expected discharge in 2-3 days to prior living arrangement once adequate pain management/ tolerating PO medications.    "

## 2019-11-16 NOTE — PROGRESS NOTES
[facility] acute hospital, [place]    Medicine Progress Note - Hospitalist Service, Gold 8       Date of Admission:  11/10/2019  Assessment & Plan   Jessie Sanchez is a 44 year old female admitted on 11/10/2019. She has a history of alcohol abuse, cirrhosis and diastolic heart failure and transferred to our facility from an outside hospital where she has been admitted for hepatitis from 11/1/2019    TODAY  - Ceftriaxone for possible UTI  - Trend CBC, INR, and Fib BID with transfusion goal of Hb < 7, Plt < 20, Fib < 100, and INR > 1.8 with active bleeding.   - Continue lactulose and rifaximin  - Hold diuretics. Albumin challenge  - MELD labs iimproving  - Calorie counts --> push diet  - Ambulate    # New HCV infection  # Alcoholic hepatitis  # Hx of alcoholic cirrhosis with acute decompensation  # Hyponatremia, coagulopathy, thrmobocytopenia  # MARISSA -- pre-renal vs. hepatoreanl  - Was treated with steroids which has been stopped due to increased lille score. Hep C RNA showed 3.8 million viral count. US without clot.   Hepatology was consulted and recommended intensive outpatient chem dep treatment for alcohol cessation (last drink 2 months ago) and outpatient hepatology follow-up to start HCV treatment.   - HE: Continue lactulose, start rifaximin  - Ascites/edema: Hold lasix, spironolactone.  PRN IVFs given hypotension.   -- EV: None on EGD in 12/2018.  Repeat due, can be done outpatient.  TTE   -- Inpatient chem dep consult -- unable to help her given she is from [place].  They asked her to contact her health insurance to figure out local resources.   - Folic acid, MVI, thiamine  - TTE with hyperdynamic EF  - VIt k 10mg for 3 days  - Albumin 50g on 11/13/2019, will do 100g on 11/14/2019 and 11/15/2019 given mild MARISSA and hypotension.  Check UA and FeNA  - Infectious workup with BC x 2, not enough ascites to tap, no resp sxs so will hold off on CXR.   MELD-Na score: 34 at 11/16/2019  6:25 AM  MELD  score: 32 at 11/16/2019  6:25 AM  Calculated from:  Serum Creatinine: 1.09 mg/dL at 11/16/2019  6:25 AM  Serum Sodium: 130 mmol/L at 11/16/2019  6:25 AM  Total Bilirubin: 19.1 mg/dL at 11/16/2019  6:25 AM  INR(ratio): 3.25 at 11/16/2019  6:25 AM  Age: 44 years    # Concern for E Coli UTI vs. Asymptomatic bacteruria  - Given decompensated cirrhosis, will treat with ceftriaxone.  Narrow to PO at discharge.   - CXR without effusion or PNA  - BC with NGTD    # Gum bleeding on 11/15/2019, resolved  - Concern for coagulopathy of liver dz vs. DIC  - Replace FFP and cryo on 11/15/2019  - Trend CBC, INR, and Fib BID with transfusion goal of Hb < 7, Plt < 20, Fib < 100, and INR > 1.8 with active bleeding.     # Anemia, stable  # ? GI bleed, monitor   -- Some dilution with albumin and blood products.  monitor for signs of GI bleed.  Per , had been having black stool on 11/14 and 11/15 but hadn't mentioned to me.  On 11/16 when mentioned to me, I did a rectal exam which showed brown stool.    - Some gastritis/esophagitis on EGD in 12/2018 without varices.    - BID PPI PO       Diet: Combination Diet Regular Diet Adult  Room Service  Diet  Calorie Counts  Snacks/Supplements Adult: Boost Shake; Between Meals    DVT Prophylaxis: low platelet count. SCDs  Irby Catheter: not present  Code Status: Full Code      Disposition Plan   Expected discharge: 2-3 days, recommended to prior living arrangement once adequate pain management/ tolerating PO medications.  Entered: Jasmin Cta MD 11/16/2019, 2:59 PM       The patient's care was discussed with the Patient and Hepatology Consultant.    Jasmin Cat MD  Hospitalist Service, [address], [place]  Pager: [phone]  Please see sticky note for cross cover information  ______________________________________________________________________    Interval History   Overall feeling the same.    Some RUQ Abd pain.  Today  says she had been having  charcoal like stool for the past few days but turned brown today.  However, they didn't mention this the last few days.  Says has had 2 brown BM today.     Denies fever/chills, chest pain, SOB.     Data reviewed today: I reviewed all medications, new labs and imaging results over the last 24 hours. I personally reviewed no images or EKG's today.    Physical Exam   Vital Signs: Temp: 99  F (37.2  C) Temp src: Oral BP: 99/40 Pulse: 101   Resp: 18 SpO2: 97 % O2 Device: Nasal cannula Oxygen Delivery: 2 LPM  Weight: 204 lbs 1.6 oz     Lying in bed, ill appearing  No further gum bleeding  CTAB on RA  RRR  Mild abd distention, mild tenderness  No LE edema  Rectal exam with female chaperone, brown stool  Sleepy but arousable, + asterixis.

## 2019-11-16 NOTE — PROGRESS NOTES
Plasma infused without a problem- however, patient running a low grade temp of 99.9 with other VSS.

## 2019-11-16 NOTE — PROGRESS NOTES
Brief GI Note:    43 yo admitted with severe alcoholic hepatitis with possible acute (vs. Chronic) hepatitis C on alcoholic cirrhosis, course complicated with hypotension and MARISSA. 11/14-11/15 Blood pressure remains soft, Cr rising to 1.29 (prior baseline ~ 0.7). LFTs rising with worsening coagulopathy and mucosal bleeding 11/15/19, MELD-Na 37 (60-65% 90 day mortality). Overall, her clinical course remains very guarded given severity of underlying liver disease. Unfortunately, we do not have much to offer with regard to her liver disease given recent alcohol use. Hepatitis C therapy is not offered inpatient and would not change the course of her trajectory. At this juncture, continue excellent supportive cares, monitor for and treat infection and correct coagulopathy as able with vitamin K and blood products as appropriate. If she stabilizes and is able to discharge we will assist with scheduling follow up with hepatology in [place] to discuss outpatient treatment of Hepatitis C and stress importance of absolute abstinence from alcohol.    Recommedations:  -- Complete Albumin (25%) challenge with 100g daily x3 days  -- Complete vitamin K challenge with 10mg IV vitamin K x3 days  -- Avoid nephrotoxic agents including diureitcs  -- May consider heme/onc consultation for coagulopathy  -- Consider sending peripheral smear, haptoglobin, LDH for anemia and thrombocytopenia  -- Complete infectious evaluation including CXR and diagnostic paracentesis as able (if enough ascites)  -- Agree with treatment of E. Coli UTI (UA with nitrites, blood, 12 WBC, 18 RBC, 4 hyaline casts)   -- Follow-up urine culture results    -- Follow-up blood culture results  -- Monitor for signs of overt GI bleeding  -- Avoid hypotension, narcotics, and benzos  -- Continue to encourage absolute alcohol cessation  -- Encourage high protein, low sodium diet (2g Na), there is no need for fluid restriction from hepatology standpoint  -- Lactulose,  "titrate to 3-4 soft bowel movements daily (or 1-2L)  -- Rifaxamin 500mg PO BID  -- Ambulate QID, PT/OT  -- Trend daily MELD labs (BMP, LFTs, PT/INR)  -- Inpatient GI will follow peripherally over the weekend, please call if any specific questions.    Discussed with Dr. Segovia.    Blanca Tavarez MD  GI Fellow  P: [phone]    ================  S: Lying in bed, indigestion improved but ongoing upper abdominal pain with some foods. Interval development of mucosal bleeding.    Physical Exam:  BP (!) 95/34 (BP Location: Right arm)   Pulse 112   Temp 98.5  F (36.9  C) (Axillary)   Resp 24   Ht 1.727 m (5' 8\")   Wt 92.6 kg (204 lb 1.6 oz)   SpO2 92%   BMI 31.03 kg/m      CONSTITUTIONAL: Ms. Grayson is lying down in bed in no apparent distress on exam.  HEENT:  NC/AT.  OP Clear.  MMM. Sclera icteric.  LUNGS: No increased work of breathing  CARDIOVASCULAR: RRR w/ no M/R/G.   ABDOMEN: Moderately distended and TTP throughout.  MUSCULOSKELETAL:  Moves all four extremities without difficulty.  DP pulses intact.  No lower extremity edema.   NEURO: A&Ox3, CN II-XII grossly intact, non-focal. Asterixis present  PSYCHIATRIC:  Flataffect.  SKIN: Warm, Dry, No rashes.     MELD-Na score: 37 at 11/15/2019  6:20 PM  MELD score: 36 at 11/15/2019  6:20 PM  Calculated from:  Serum Creatinine: 1.29 mg/dL at 11/15/2019  5:36 AM  Serum Sodium: 126 mmol/L at 11/15/2019  5:36 AM  Total Bilirubin: 20.3 mg/dL at 11/15/2019  5:36 AM  INR(ratio): 3.92 at 11/15/2019  6:20 PM  Age: 44 years    "

## 2019-11-17 LAB
ALBUMIN SERPL-MCNC: 3.8 G/DL (ref 3.4–5)
ALP SERPL-CCNC: 126 U/L (ref 40–150)
ALT SERPL W P-5'-P-CCNC: 106 U/L (ref 0–50)
ANION GAP SERPL CALCULATED.3IONS-SCNC: 6 MMOL/L (ref 3–14)
AST SERPL W P-5'-P-CCNC: 94 U/L (ref 0–45)
BASOPHILS # BLD AUTO: 0 10E9/L (ref 0–0.2)
BASOPHILS # BLD AUTO: 0 10E9/L (ref 0–0.2)
BASOPHILS NFR BLD AUTO: 0.2 %
BASOPHILS NFR BLD AUTO: 0.3 %
BILIRUB SERPL-MCNC: 22.2 MG/DL (ref 0.2–1.3)
BUN SERPL-MCNC: 38 MG/DL (ref 7–30)
CALCIUM SERPL-MCNC: 9.1 MG/DL (ref 8.5–10.1)
CHLORIDE SERPL-SCNC: 98 MMOL/L (ref 94–109)
CO2 SERPL-SCNC: 24 MMOL/L (ref 20–32)
CREAT SERPL-MCNC: 0.79 MG/DL (ref 0.52–1.04)
CREAT SERPL-MCNC: 1.16 MG/DL (ref 0.52–1.04)
CREAT UR-MCNC: 82 MG/DL
DIFFERENTIAL METHOD BLD: ABNORMAL
DIFFERENTIAL METHOD BLD: ABNORMAL
EOSINOPHIL # BLD AUTO: 0.4 10E9/L (ref 0–0.7)
EOSINOPHIL # BLD AUTO: 0.7 10E9/L (ref 0–0.7)
EOSINOPHIL NFR BLD AUTO: 5.7 %
EOSINOPHIL NFR BLD AUTO: 5.7 %
ERYTHROCYTE [DISTWIDTH] IN BLOOD BY AUTOMATED COUNT: 24 % (ref 10–15)
ERYTHROCYTE [DISTWIDTH] IN BLOOD BY AUTOMATED COUNT: 24.5 % (ref 10–15)
FIBRINOGEN PPP-MCNC: 106 MG/DL (ref 200–420)
FIBRINOGEN PPP-MCNC: 122 MG/DL (ref 200–420)
FRACT EXCRET NA UR+SERPL-RTO: 0.1 %
GFR SERPL CREATININE-BSD FRML MDRD: 57 ML/MIN/{1.73_M2}
GFR SERPL CREATININE-BSD FRML MDRD: >90 ML/MIN/{1.73_M2}
GLUCOSE BLDC GLUCOMTR-MCNC: 89 MG/DL (ref 70–99)
GLUCOSE SERPL-MCNC: 102 MG/DL (ref 70–99)
HCT VFR BLD AUTO: 23.1 % (ref 35–47)
HCT VFR BLD AUTO: 24.2 % (ref 35–47)
HGB BLD-MCNC: 7.4 G/DL (ref 11.7–15.7)
HGB BLD-MCNC: 8 G/DL (ref 11.7–15.7)
IMM GRANULOCYTES # BLD: 0.1 10E9/L (ref 0–0.4)
IMM GRANULOCYTES # BLD: 0.2 10E9/L (ref 0–0.4)
IMM GRANULOCYTES NFR BLD: 1.8 %
IMM GRANULOCYTES NFR BLD: 2 %
INR PPP: 3.83 (ref 0.86–1.14)
INR PPP: 4.27 (ref 0.86–1.14)
LYMPHOCYTES # BLD AUTO: 0.9 10E9/L (ref 0.8–5.3)
LYMPHOCYTES # BLD AUTO: 2 10E9/L (ref 0.8–5.3)
LYMPHOCYTES NFR BLD AUTO: 12.1 %
LYMPHOCYTES NFR BLD AUTO: 17.5 %
MCH RBC QN AUTO: 27.6 PG (ref 26.5–33)
MCH RBC QN AUTO: 28.7 PG (ref 26.5–33)
MCHC RBC AUTO-ENTMCNC: 32 G/DL (ref 31.5–36.5)
MCHC RBC AUTO-ENTMCNC: 33.1 G/DL (ref 31.5–36.5)
MCV RBC AUTO: 86 FL (ref 78–100)
MCV RBC AUTO: 87 FL (ref 78–100)
MONOCYTES # BLD AUTO: 0.7 10E9/L (ref 0–1.3)
MONOCYTES # BLD AUTO: 1.3 10E9/L (ref 0–1.3)
MONOCYTES NFR BLD AUTO: 10 %
MONOCYTES NFR BLD AUTO: 10.9 %
NEUTROPHILS # BLD AUTO: 5.1 10E9/L (ref 1.6–8.3)
NEUTROPHILS # BLD AUTO: 7.5 10E9/L (ref 1.6–8.3)
NEUTROPHILS NFR BLD AUTO: 63.7 %
NEUTROPHILS NFR BLD AUTO: 70.1 %
NRBC # BLD AUTO: 0 10*3/UL
NRBC # BLD AUTO: 0 10*3/UL
NRBC BLD AUTO-RTO: 0 /100
NRBC BLD AUTO-RTO: 0 /100
PLATELET # BLD AUTO: 49 10E9/L (ref 150–450)
PLATELET # BLD AUTO: 52 10E9/L (ref 150–450)
POTASSIUM SERPL-SCNC: 3.4 MMOL/L (ref 3.4–5.3)
PROT SERPL-MCNC: 5.7 G/DL (ref 6.8–8.8)
RBC # BLD AUTO: 2.68 10E12/L (ref 3.8–5.2)
RBC # BLD AUTO: 2.79 10E12/L (ref 3.8–5.2)
SODIUM SERPL-SCNC: 129 MMOL/L (ref 133–144)
SODIUM SERPL-SCNC: 130 MMOL/L (ref 133–144)
SODIUM UR-SCNC: 14 MMOL/L
WBC # BLD AUTO: 11.7 10E9/L (ref 4–11)
WBC # BLD AUTO: 7.2 10E9/L (ref 4–11)

## 2019-11-17 PROCEDURE — 85025 COMPLETE CBC W/AUTO DIFF WBC: CPT | Performed by: STUDENT IN AN ORGANIZED HEALTH CARE EDUCATION/TRAINING PROGRAM

## 2019-11-17 PROCEDURE — 99233 SBSQ HOSP IP/OBS HIGH 50: CPT | Performed by: STUDENT IN AN ORGANIZED HEALTH CARE EDUCATION/TRAINING PROGRAM

## 2019-11-17 PROCEDURE — 84295 ASSAY OF SERUM SODIUM: CPT | Performed by: PEDIATRICS

## 2019-11-17 PROCEDURE — 85384 FIBRINOGEN ACTIVITY: CPT | Performed by: STUDENT IN AN ORGANIZED HEALTH CARE EDUCATION/TRAINING PROGRAM

## 2019-11-17 PROCEDURE — 25000128 H RX IP 250 OP 636: Performed by: STUDENT IN AN ORGANIZED HEALTH CARE EDUCATION/TRAINING PROGRAM

## 2019-11-17 PROCEDURE — 25000125 ZZHC RX 250: Performed by: STUDENT IN AN ORGANIZED HEALTH CARE EDUCATION/TRAINING PROGRAM

## 2019-11-17 PROCEDURE — 99207 ZZC NO CHARGE LOS: CPT | Performed by: STUDENT IN AN ORGANIZED HEALTH CARE EDUCATION/TRAINING PROGRAM

## 2019-11-17 PROCEDURE — 80053 COMPREHEN METABOLIC PANEL: CPT | Performed by: STUDENT IN AN ORGANIZED HEALTH CARE EDUCATION/TRAINING PROGRAM

## 2019-11-17 PROCEDURE — 36415 COLL VENOUS BLD VENIPUNCTURE: CPT | Performed by: STUDENT IN AN ORGANIZED HEALTH CARE EDUCATION/TRAINING PROGRAM

## 2019-11-17 PROCEDURE — P9047 ALBUMIN (HUMAN), 25%, 50ML: HCPCS | Performed by: STUDENT IN AN ORGANIZED HEALTH CARE EDUCATION/TRAINING PROGRAM

## 2019-11-17 PROCEDURE — 82565 ASSAY OF CREATININE: CPT | Performed by: PEDIATRICS

## 2019-11-17 PROCEDURE — 12000001 ZZH R&B MED SURG/OB UMMC

## 2019-11-17 PROCEDURE — 84300 ASSAY OF URINE SODIUM: CPT | Performed by: STUDENT IN AN ORGANIZED HEALTH CARE EDUCATION/TRAINING PROGRAM

## 2019-11-17 PROCEDURE — 85610 PROTHROMBIN TIME: CPT | Performed by: STUDENT IN AN ORGANIZED HEALTH CARE EDUCATION/TRAINING PROGRAM

## 2019-11-17 PROCEDURE — 25000132 ZZH RX MED GY IP 250 OP 250 PS 637: Performed by: NURSE PRACTITIONER

## 2019-11-17 PROCEDURE — 00000146 ZZHCL STATISTIC GLUCOSE BY METER IP

## 2019-11-17 PROCEDURE — 25000132 ZZH RX MED GY IP 250 OP 250 PS 637: Performed by: STUDENT IN AN ORGANIZED HEALTH CARE EDUCATION/TRAINING PROGRAM

## 2019-11-17 PROCEDURE — 82570 ASSAY OF URINE CREATININE: CPT | Performed by: STUDENT IN AN ORGANIZED HEALTH CARE EDUCATION/TRAINING PROGRAM

## 2019-11-17 RX ORDER — ALBUMIN (HUMAN) 12.5 G/50ML
50 SOLUTION INTRAVENOUS ONCE
Status: COMPLETED | OUTPATIENT
Start: 2019-11-17 | End: 2019-11-17

## 2019-11-17 RX ADMIN — GABAPENTIN 300 MG: 300 CAPSULE ORAL at 20:53

## 2019-11-17 RX ADMIN — HYDROMORPHONE HYDROCHLORIDE 4 MG: 2 TABLET ORAL at 12:17

## 2019-11-17 RX ADMIN — RIFAXIMIN 550 MG: 550 TABLET ORAL at 08:40

## 2019-11-17 RX ADMIN — GABAPENTIN 300 MG: 300 CAPSULE ORAL at 21:04

## 2019-11-17 RX ADMIN — PANTOPRAZOLE SODIUM 40 MG: 40 TABLET, DELAYED RELEASE ORAL at 15:52

## 2019-11-17 RX ADMIN — LACTULOSE 30 ML: 20 SOLUTION ORAL at 20:52

## 2019-11-17 RX ADMIN — HYDROMORPHONE HYDROCHLORIDE 4 MG: 2 TABLET ORAL at 22:27

## 2019-11-17 RX ADMIN — THERA TABS 1 TABLET: TAB at 08:40

## 2019-11-17 RX ADMIN — HYDROMORPHONE HYDROCHLORIDE 4 MG: 2 TABLET ORAL at 15:52

## 2019-11-17 RX ADMIN — LACTULOSE 30 ML: 20 SOLUTION ORAL at 08:40

## 2019-11-17 RX ADMIN — ALBUMIN HUMAN 50 G: 0.25 SOLUTION INTRAVENOUS at 11:22

## 2019-11-17 RX ADMIN — HYDROMORPHONE HYDROCHLORIDE 4 MG: 2 TABLET ORAL at 19:34

## 2019-11-17 RX ADMIN — PHYTONADIONE 10 MG: 10 INJECTION, EMULSION INTRAMUSCULAR; INTRAVENOUS; SUBCUTANEOUS at 08:41

## 2019-11-17 RX ADMIN — PANTOPRAZOLE SODIUM 40 MG: 40 TABLET, DELAYED RELEASE ORAL at 08:40

## 2019-11-17 RX ADMIN — CEFTRIAXONE 1 G: 1 INJECTION, POWDER, FOR SOLUTION INTRAMUSCULAR; INTRAVENOUS at 08:41

## 2019-11-17 RX ADMIN — GABAPENTIN 300 MG: 300 CAPSULE ORAL at 15:52

## 2019-11-17 RX ADMIN — RIFAXIMIN 550 MG: 550 TABLET ORAL at 20:52

## 2019-11-17 RX ADMIN — HYDROMORPHONE HYDROCHLORIDE 4 MG: 2 TABLET ORAL at 08:50

## 2019-11-17 RX ADMIN — GABAPENTIN 300 MG: 300 CAPSULE ORAL at 08:40

## 2019-11-17 ASSESSMENT — ACTIVITIES OF DAILY LIVING (ADL)
ADLS_ACUITY_SCORE: 13
ADLS_ACUITY_SCORE: 15
ADLS_ACUITY_SCORE: 13
ADLS_ACUITY_SCORE: 15
ADLS_ACUITY_SCORE: 15
ADLS_ACUITY_SCORE: 13

## 2019-11-17 ASSESSMENT — MIFFLIN-ST. JEOR: SCORE: 1633.36

## 2019-11-17 NOTE — PROGRESS NOTES
[facility], [facility] Progress Note - Hospitalist Service, Gold 8       Date of Admission:  11/10/2019  Assessment & Plan   Jessie Sanchez is a 44 year old female admitted on 11/10/2019. She has a history of alcohol abuse, cirrhosis and diastolic heart failure and transferred to our facility from an outside hospital where she has been admitted for hepatitis from 11/1/2019    TODAY  - Ceftriaxone for possible UTI  - Continue lactulose and rifaximin  - 50g Albumin given continued lightheadedness  - Compression stockings  - Calorie counts --> push diet  - Ambulate    # New HCV infection  # Alcoholic hepatitis  # Hx of alcoholic cirrhosis with acute decompensation  # Hyponatremia, coagulopathy, thrmobocytopenia  # MARISSA -- pre-renal vs. hepatoreanl  - Was treated with steroids which has been stopped due to increased lille score. Hep C RNA showed 3.8 million viral count. US without clot.   Hepatology was consulted and recommended intensive outpatient chem dep treatment for alcohol cessation (last drink 2 months ago) and outpatient hepatology follow-up to start HCV treatment.   - HE: Continue lactulose, start rifaximin  - Ascites/edema: Hold lasix, spironolactone.  PRN IVFs given hypotension.   -- EV: None on EGD in 12/2018.  Repeat due, can be done outpatient.  TTE   -- Inpatient chem dep consult -- unable to help her given she is from [place].  They asked her to contact her health insurance to figure out local resources.   - Folic acid, MVI, thiamine  - TTE with hyperdynamic EF  - VIt k 10mg for 3 days  - Albumin 50g on 11/13/2019, will do 100g on 11/14/2019 and 11/15/2019 given mild MARISSA and hypotension.  Check UA and FeNA  - Infectious workup with BC x 2, not enough ascites to tap, no resp sxs so will hold off on CXR.   MELD-Na score: 36 at 11/17/2019  6:13 AM  MELD score: 35 at 11/17/2019  6:13 AM  Calculated from:  Serum Creatinine: 1.16 mg/dL at 11/17/2019  6:13 AM  Serum  Sodium: 129 mmol/L at 11/17/2019  6:13 AM  Total Bilirubin: 22.2 mg/dL at 11/17/2019  6:13 AM  INR(ratio): 3.83 at 11/17/2019  6:13 AM  Age: 44 years    # Concern for E Coli UTI vs. Asymptomatic bacteruria  - Given decompensated cirrhosis, will treat with ceftriaxone.  Narrow to PO at discharge.   - CXR without effusion or PNA  - BC with NGTD    # Gum bleeding on 11/15/2019, resolved  - Concern for coagulopathy of liver dz vs. DIC  - Replace FFP and cryo on 11/15/2019  - Trend CBC, INR, and Fib BID with transfusion goal of Hb < 7, Plt < 20, Fib < 100, and INR > 1.8 with active bleeding.     # Anemia, stable  # ? GI bleed, monitor   -- Some dilution with albumin and blood products.  monitor for signs of GI bleed.  Per , had been having black stool on 11/14 and 11/15 but hadn't mentioned to me.  On 11/16 when mentioned to me, I did a rectal exam which showed brown stool.    - Some gastritis/esophagitis on EGD in 12/2018 without varices.    - BID PPI PO       Diet: Combination Diet Regular Diet Adult  Room Service  Diet  Calorie Counts  Snacks/Supplements Adult: Boost Shake; Between Meals    DVT Prophylaxis: low platelet count. SCDs  Irby Catheter: not present  Code Status: Full Code      Disposition Plan   Expected discharge: 2-3 days, recommended to prior living arrangement once adequate pain management/ tolerating PO medications.  Entered: Jasmin Cat MD 11/17/2019, 4:09 PM       The patient's care was discussed with the Patient and Hepatology Consultant.    Jasmin Cat MD  Hospitalist Service, [address], [place]  Pager: [phone]  Please see sticky note for cross cover information  ______________________________________________________________________    Interval History   Overall feeling the same.    Some epistaxis last night but stopped today.     Denies fever/chills, chest pain, SOB.     Data reviewed today: I reviewed all medications, new labs and imaging results  over the last 24 hours. I personally reviewed no images or EKG's today.    Physical Exam   Vital Signs: Temp: 97.5  F (36.4  C) Temp src: Oral BP: 112/57 Pulse: 99   Resp: 18 SpO2: 98 % O2 Device: None (Room air) Oxygen Delivery: 2 LPM  Weight: 203 lbs 12.8 oz     Lying in bed, but sitting up today, appears more energetic  CTAB on RA  RRR  Mild abd distention, mild tenderness  No LE edema  More awake today, + asterixis

## 2019-11-17 NOTE — PROGRESS NOTES
Calorie Count  Intake recorded for: 11/16  Total Kcals: 3251 Total Protein: 122g  Kcals from Hospital Food: 3251   Protein: 122g  Kcals from Outside Food (average): 0 Protein: 0g  # Meals Recorded: 2 meals (First: 100% 4 whole milk, 50% mead cheeseburger, cream of potato soup)      (Second: 100% banana bread, 2 whole milk, brownie, sausage niurka, 1 egg, 75% 2 Canadian toast w/ syrup & butter)  # Supplements Recorded: 100% 2 Boost Plus

## 2019-11-17 NOTE — PLAN OF CARE
"Assumed cares 0700 to 1900.     BP 99/40 (BP Location: Right arm)   Pulse 101   Temp 99  F (37.2  C) (Oral)   Resp 18   Ht 1.727 m (5' 8\")   Wt 92.4 kg (203 lb 12.8 oz)   SpO2 97%   BMI 30.99 kg/m       Pain: Reported in abd, admin dilaudid ATC.  Neuro: A and O x 4. Pt appears a little lethargic though.  Respiratory: Lung sounds clear and equal on RA.   Cardiac/Neurovascular: HR slightly elevated. Murmur auscultated. Pulses WDL.  GI/: Bowel sounds active. Pt reports many BMs. Adequate UOP. Urine deborah in color. Encouraging pt to save urine. Pt does need urine sample still.  Nutrition: Fair intake. On alexander counts, encouraging pt/spouse to save receipts.  Activity: Up SBA.   Skin: Jaundiced.  Lines: 2 PIVs in LUE, both saline locked, sites WDL.  Events this shift: 2 bags of albumin admin per orders.     Plan: Continue to monitor.    "

## 2019-11-17 NOTE — PLAN OF CARE
"Assumed cares at 6033-5132. A/o x 4- Slow to respond and lethargic at times- Falling asleep mid conversation. VSS on 2L NC(O2 only at HS per pt)- Soft BP. C.o pain in abdomen and back- PRN Dilaudid given x 1. L PIV x 2 SL. Pt had bloody nose this AM- Able to stop on own- Writer did see kleenex with some blood streaks present. 2L FR- Education given on importance. Jamin counts- Education given to pt and  regarding importance of monitoring and both expressed understanding.  BG 89 at 0200. UA still needed- Hat in bathroom and pt aware. Strict I & O. Last ETOH \"About 2 months\" per pt. MELD 32 as of 11/16/2019.  in room with pt throughout night.     P: Cont with POC. Monitor VS, labs and pain. Obtain UA. Plan fir discharge in next 2-3 days pending adequate pain control and tolerating PO per MD note. Call light within reach. Will continue to monitor.     Addendum: Down trending Na this  (130). Up trending INR 3.83 (3.56).   "

## 2019-11-18 LAB
ALBUMIN SERPL-MCNC: 3.4 G/DL (ref 3.4–5)
ALP SERPL-CCNC: 108 U/L (ref 40–150)
ALT SERPL W P-5'-P-CCNC: 78 U/L (ref 0–50)
ANION GAP SERPL CALCULATED.3IONS-SCNC: 4 MMOL/L (ref 3–14)
AST SERPL W P-5'-P-CCNC: 67 U/L (ref 0–45)
BASOPHILS # BLD AUTO: 0 10E9/L (ref 0–0.2)
BASOPHILS NFR BLD AUTO: 0.3 %
BILIRUB SERPL-MCNC: 20.3 MG/DL (ref 0.2–1.3)
BUN SERPL-MCNC: 34 MG/DL (ref 7–30)
CALCIUM SERPL-MCNC: 8.6 MG/DL (ref 8.5–10.1)
CHLORIDE SERPL-SCNC: 99 MMOL/L (ref 94–109)
CO2 SERPL-SCNC: 26 MMOL/L (ref 20–32)
COPATH REPORT: NORMAL
CREAT SERPL-MCNC: 0.83 MG/DL (ref 0.52–1.04)
DIFFERENTIAL METHOD BLD: ABNORMAL
EOSINOPHIL # BLD AUTO: 0.4 10E9/L (ref 0–0.7)
EOSINOPHIL NFR BLD AUTO: 5.3 %
ERYTHROCYTE [DISTWIDTH] IN BLOOD BY AUTOMATED COUNT: 24.4 % (ref 10–15)
FIBRINOGEN PPP-MCNC: 100 MG/DL (ref 200–420)
GFR SERPL CREATININE-BSD FRML MDRD: 85 ML/MIN/{1.73_M2}
GLUCOSE BLDC GLUCOMTR-MCNC: 100 MG/DL (ref 70–99)
GLUCOSE SERPL-MCNC: 95 MG/DL (ref 70–99)
HAPTOGLOB SERPL-MCNC: <6 MG/DL (ref 15–200)
HCT VFR BLD AUTO: 21.7 % (ref 35–47)
HGB BLD-MCNC: 7.2 G/DL (ref 11.7–15.7)
IMM GRANULOCYTES # BLD: 0.1 10E9/L (ref 0–0.4)
IMM GRANULOCYTES NFR BLD: 1 %
INR PPP: 4.3 (ref 0.86–1.14)
LYMPHOCYTES # BLD AUTO: 1.2 10E9/L (ref 0.8–5.3)
LYMPHOCYTES NFR BLD AUTO: 15.4 %
MCH RBC QN AUTO: 28 PG (ref 26.5–33)
MCHC RBC AUTO-ENTMCNC: 33.2 G/DL (ref 31.5–36.5)
MCV RBC AUTO: 84 FL (ref 78–100)
MONOCYTES # BLD AUTO: 1.1 10E9/L (ref 0–1.3)
MONOCYTES NFR BLD AUTO: 14.1 %
NEUTROPHILS # BLD AUTO: 5.1 10E9/L (ref 1.6–8.3)
NEUTROPHILS NFR BLD AUTO: 63.9 %
NRBC # BLD AUTO: 0 10*3/UL
NRBC BLD AUTO-RTO: 0 /100
PLATELET # BLD AUTO: 45 10E9/L (ref 150–450)
POTASSIUM SERPL-SCNC: 3.6 MMOL/L (ref 3.4–5.3)
PROT SERPL-MCNC: 5.3 G/DL (ref 6.8–8.8)
RBC # BLD AUTO: 2.57 10E12/L (ref 3.8–5.2)
SODIUM SERPL-SCNC: 129 MMOL/L (ref 133–144)
WBC # BLD AUTO: 7.9 10E9/L (ref 4–11)

## 2019-11-18 PROCEDURE — 36415 COLL VENOUS BLD VENIPUNCTURE: CPT | Performed by: STUDENT IN AN ORGANIZED HEALTH CARE EDUCATION/TRAINING PROGRAM

## 2019-11-18 PROCEDURE — 85384 FIBRINOGEN ACTIVITY: CPT | Performed by: STUDENT IN AN ORGANIZED HEALTH CARE EDUCATION/TRAINING PROGRAM

## 2019-11-18 PROCEDURE — 99207 ZZC NO CHARGE LOS: CPT | Performed by: STUDENT IN AN ORGANIZED HEALTH CARE EDUCATION/TRAINING PROGRAM

## 2019-11-18 PROCEDURE — 12000001 ZZH R&B MED SURG/OB UMMC

## 2019-11-18 PROCEDURE — 99233 SBSQ HOSP IP/OBS HIGH 50: CPT | Performed by: STUDENT IN AN ORGANIZED HEALTH CARE EDUCATION/TRAINING PROGRAM

## 2019-11-18 PROCEDURE — 80053 COMPREHEN METABOLIC PANEL: CPT | Performed by: STUDENT IN AN ORGANIZED HEALTH CARE EDUCATION/TRAINING PROGRAM

## 2019-11-18 PROCEDURE — 00000146 ZZHCL STATISTIC GLUCOSE BY METER IP

## 2019-11-18 PROCEDURE — 85025 COMPLETE CBC W/AUTO DIFF WBC: CPT | Performed by: STUDENT IN AN ORGANIZED HEALTH CARE EDUCATION/TRAINING PROGRAM

## 2019-11-18 PROCEDURE — 85610 PROTHROMBIN TIME: CPT | Performed by: STUDENT IN AN ORGANIZED HEALTH CARE EDUCATION/TRAINING PROGRAM

## 2019-11-18 PROCEDURE — 25000132 ZZH RX MED GY IP 250 OP 250 PS 637: Performed by: STUDENT IN AN ORGANIZED HEALTH CARE EDUCATION/TRAINING PROGRAM

## 2019-11-18 PROCEDURE — 25000128 H RX IP 250 OP 636: Performed by: STUDENT IN AN ORGANIZED HEALTH CARE EDUCATION/TRAINING PROGRAM

## 2019-11-18 PROCEDURE — 25000132 ZZH RX MED GY IP 250 OP 250 PS 637: Performed by: NURSE PRACTITIONER

## 2019-11-18 RX ORDER — GABAPENTIN 300 MG/1
300-600 CAPSULE ORAL 3 TIMES DAILY
Qty: 180 CAPSULE | Refills: 1 | Status: SHIPPED | OUTPATIENT
Start: 2019-11-18

## 2019-11-18 RX ORDER — PANTOPRAZOLE SODIUM 40 MG/1
40 TABLET, DELAYED RELEASE ORAL DAILY
Qty: 60 TABLET | Refills: 1 | Status: SHIPPED | OUTPATIENT
Start: 2019-11-18

## 2019-11-18 RX ORDER — CEFTRIAXONE 1 G/1
1 INJECTION, POWDER, FOR SOLUTION INTRAMUSCULAR; INTRAVENOUS EVERY 24 HOURS
Status: COMPLETED | OUTPATIENT
Start: 2019-11-19 | End: 2019-11-19

## 2019-11-18 RX ADMIN — CEFTRIAXONE 1 G: 1 INJECTION, POWDER, FOR SOLUTION INTRAMUSCULAR; INTRAVENOUS at 08:24

## 2019-11-18 RX ADMIN — HYDROMORPHONE HYDROCHLORIDE 4 MG: 2 TABLET ORAL at 14:56

## 2019-11-18 RX ADMIN — HYDROMORPHONE HYDROCHLORIDE 4 MG: 2 TABLET ORAL at 21:42

## 2019-11-18 RX ADMIN — RIFAXIMIN 550 MG: 550 TABLET ORAL at 20:17

## 2019-11-18 RX ADMIN — HYDROMORPHONE HYDROCHLORIDE 4 MG: 2 TABLET ORAL at 01:38

## 2019-11-18 RX ADMIN — GABAPENTIN 300 MG: 300 CAPSULE ORAL at 08:24

## 2019-11-18 RX ADMIN — PANTOPRAZOLE SODIUM 40 MG: 40 TABLET, DELAYED RELEASE ORAL at 18:11

## 2019-11-18 RX ADMIN — LACTULOSE 30 ML: 20 SOLUTION ORAL at 20:16

## 2019-11-18 RX ADMIN — THERA TABS 1 TABLET: TAB at 08:24

## 2019-11-18 RX ADMIN — Medication 25 MG: at 21:42

## 2019-11-18 RX ADMIN — HYDROMORPHONE HYDROCHLORIDE 4 MG: 2 TABLET ORAL at 12:04

## 2019-11-18 RX ADMIN — PANTOPRAZOLE SODIUM 40 MG: 40 TABLET, DELAYED RELEASE ORAL at 08:24

## 2019-11-18 RX ADMIN — LACTULOSE 30 ML: 20 SOLUTION ORAL at 08:24

## 2019-11-18 RX ADMIN — HYDROMORPHONE HYDROCHLORIDE 4 MG: 2 TABLET ORAL at 08:23

## 2019-11-18 RX ADMIN — HYDROMORPHONE HYDROCHLORIDE 4 MG: 2 TABLET ORAL at 18:21

## 2019-11-18 RX ADMIN — RIFAXIMIN 550 MG: 550 TABLET ORAL at 08:24

## 2019-11-18 RX ADMIN — GABAPENTIN 300 MG: 300 CAPSULE ORAL at 20:17

## 2019-11-18 RX ADMIN — GABAPENTIN 300 MG: 300 CAPSULE ORAL at 14:56

## 2019-11-18 ASSESSMENT — ACTIVITIES OF DAILY LIVING (ADL)
ADLS_ACUITY_SCORE: 15

## 2019-11-18 ASSESSMENT — MIFFLIN-ST. JEOR: SCORE: 1643.34

## 2019-11-18 NOTE — PLAN OF CARE
Assumed cares 0700 to 1500.    VS: Slightly tachy @ start of shift, but last HR was 99.    Assessment WDL ex:  -Pain: In abd, admin dilaudid x 2.  -Neuro: Pt a little lethargic.  -Cardiovascular: HR a little elevated at start of shift. Heart murmur. Trace LE edema, compression stockings applied this shift.  -GI/: Abd rounded, tender and painful. Urine deborah in color.  -Skin: Jaundiced.    Activity: SBA    Lines: 2 L PIVs, sites WDL    I/O: Fair intake. BM today. Encouraging pt to save urine, adequate UOP, urine deborah in color.    Plan: Discharge tomorrow?

## 2019-11-18 NOTE — PROGRESS NOTES
[facility], [facility] Progress Note - Hospitalist Service, Gold 8       Date of Admission:  11/10/2019  Assessment & Plan   Jessie Sanchez is a 44 year old female admitted on 11/10/2019. She has a history of alcohol abuse, cirrhosis and diastolic heart failure and transferred to our facility from an outside hospital where she has been admitted for hepatitis from 11/1/2019    TODAY  - Ceftriaxone for possible UTI (tomorrow will be last day of 5 day course)  - Continue lactulose and rifaximin (rifaximin will not be able to given outpatient due to high cost).   - Plan to get bus tickets today for early discharge tomorrow morning.     # New HCV infection  # Alcoholic hepatitis, improving  # Hx of alcoholic cirrhosis with acute decompensation, stable  # Hyponatremia, coagulopathy, thrmobocytopenia  # MARISSA, resolved  - Was treated with steroids which has been stopped due to increased lille score. Hep C RNA showed 3.8 million viral count. US without clot.   Hepatology was consulted and recommended intensive outpatient chem dep treatment for alcohol cessation and outpatient hepatology follow-up to start HCV treatment.   - HE: Continue lactulose, start rifaximin (unable to get insurance clearance so will discontinue)  - Ascites/edema: Hold lasix, spironolactone until f/u with outpatient provider.  Here was treated with albumin 100g for 4 days with resolution of MARISSA.   -- EV: None on EGD in 12/2018.  Repeat due, can be done outpatient.    -- Inpatient chem dep consult -- unable to help her given she is from [place].  They asked her to contact her health insurance to figure out local resources.   - Was treated with folic acid, MVI, thiamine  - TTE with hyperdynamic EF  - VIt k 10mg for 3 days without much improvement in INR.   MELD-Na score: 36 at 11/18/2019  5:53 AM  MELD score: 34 at 11/18/2019  5:53 AM  Calculated from:  Serum Creatinine: 0.83 mg/dL (Rounded to 1 mg/dL) at  11/18/2019  5:53 AM  Serum Sodium: 129 mmol/L at 11/18/2019  5:53 AM  Total Bilirubin: 20.3 mg/dL at 11/18/2019  5:53 AM  INR(ratio): 4.30 at 11/18/2019  5:53 AM  Age: 44 years    # Concern for E Coli UTI vs. Asymptomatic bacteruria  - Given decompensated cirrhosis, was treated with 5 days of IV ceftriaxone. CXR without effusion or PNA. BC with NGTD    # Gum bleeding, Epistaxis on 11/15/2019, resolved - Likely 2/2 coagulopathy. Replaced FFP and cryo on 11/15/2019.   Didn't require any further products.     # Anemia, stable - Was monitored.  Didn't require any PRBC transfusion.  Some dilution effect due to albumin resuscitation.     # Gastritis/Esophagitis - Was started on PPI       Diet: Combination Diet Regular Diet Adult  Room Service  Diet  Snacks/Supplements Adult: Boost Plus; Between Meals    DVT Prophylaxis: low platelet count. SCDs  Irby Catheter: not present  Code Status: Full Code      Disposition Plan   Expected discharge: tomorrow, recommended to prior living arrangement.   Entered: Jasmin Cat MD 11/18/2019, 2:27 PM       The patient's care was discussed with the Patient and Hepatology Consultant.    Jasmin Cat MD  Hospitalist Service, [address], [place]  Pager: [phone]  Please see sticky note for cross cover information  ______________________________________________________________________    Interval History   Overall feeling the same.  Continues to have RUQ pain.  No further bleeding.     Denies fever/chills, chest pain, SOB.     Data reviewed today: I reviewed all medications, new labs and imaging results over the last 24 hours. I personally reviewed no images or EKG's today.    Physical Exam   Vital Signs: Temp: 96.5  F (35.8  C) Temp src: Oral BP: 106/47 Pulse: 99   Resp: 16 SpO2: 96 % O2 Device: None (Room air)    Weight: 206 lbs 1.6 oz     Lying in bed, tired and ill appearing, but when she wakes up she is able to answer questions appropriately  CTAB on  RA  RRR  Mild abd distention, mild tenderness  No LE edema  Mild asterixis, mental status continues to improve

## 2019-11-18 NOTE — PLAN OF CARE
"Assumed cares 0700 to 1900.     /57 (BP Location: Right arm)   Pulse 99   Temp 97.5  F (36.4  C) (Oral)   Resp 18   Ht 1.727 m (5' 8\")   Wt 93.5 kg (206 lb 1.6 oz)   SpO2 98%   BMI 31.34 kg/m      Pain: Reported in abd, admin dilaudid x 3.  Neuro: A and O x 4. Pt appears a little lethargic though.  Respiratory: Lung sounds clear and equal on RA.   Cardiac/Neurovascular: HR and pulses WDL. Murmur auscultated. Trace LE edema.  GI/: Bowel sounds active. Pt reports many BMs. Adequate UOP. Urine deborah in color. Encouraging pt to save urine. Sent urine sample to lab.  Nutrition: Fair intake. On alexander counts, encouraging pt/spouse to save receipts.  Activity: Up SBA.   Skin: Jaundiced.  Lines: 2 PIVs in LUE, sites WDL.  Events this shift: 2 bags of albumin admin per orders.     Plan: Continue to monitor.  "

## 2019-11-18 NOTE — PROGRESS NOTES
"CLINICAL NUTRITION SERVICES - ASSESSMENT NOTE     Nutrition Prescription    RECOMMENDATIONS FOR MDs/PROVIDERS TO ORDER:  None     Malnutrition Status:    Non-severe malnutrition in the context of acute on chronic condition     Recommendations already ordered by Registered Dietitian (RD):  Changed boost shake to boost plus   Ongoing encouragement to continue with frequent  Meals / snacks/utilizing whole milk with meals        REASON FOR ASSESSMENT  Jessie Sanchez is a/an 44 year old female assessed by the dietitian for LOSx8    Chart reviewed:  History of alcohol abuse, cirrhosis and diastolic heart failure and transferred to our facility from an outside hospital where she has been admitted for hepatitis from 11/1/2019    NUTRITION HISTORY  Obtained from patient and spouse in the room:  Patient was consuming a regular diet PTA. Not following any specific diet guidelines. Her intake was at baseline.     CURRENT NUTRITION ORDERS  Diet: 2 g Sodium, 1 liter fluid restrictions + Boost shake between meals   Intake/Tolerance: patient is consuming % of meals over the past 3 days. Was consuming 25% meals early on admission.     Calorie count:   11/17: 1026 kcal and 32 gm protein from 2 meals and 50% of boost shake reported  11/16: 3250 kcal and 122 gm protein from 2 meals and 2 boost plus recorded  11/15: 1812 kcal and 66 gm protein from 2 meals and 2 boost plus recorded.     Average 3 day calorie count intake: 2030 kcal /day and 73 gm protein. Met estimated needs.     LABS  Total bili: 20.3 (H)  BUN: 34, Na+: 129 (L)    MEDICATIONS  Lactulose, Rifaximin  Calcium carbonate 500 mg TID  Thera-Vit, no minerals  Lasix     ANTHROPOMETRICS  Height: 172.7 cm (5' 8\")  Most Recent Weight:87.7 kg (193 lb 6.4 oz) dry admit wt on 11/10/19  IBW: 63.6 kg (138% BW)   BMI: 29.4 kg/m2 -  Overweight BMI 25-29.9  Weight History:   Wt Readings from Last 10 Encounters:   11/17/19 93.5 kg (206 lb 1.6 oz)     Dosing Weight: 70 kg adjusted wt " from dry admit wt of 87.7 kg and IBW of 63.6 kg     ASSESSED NUTRITION NEEDS  Estimated Energy Needs: 4214-9749 kcals/day (25 - 30 kcals/kg)  Justification: Maintenance  Estimated Protein Needs: 70-84  grams protein/day (1 - 1.2 grams of pro/kg)  Justification: Maintenance  Estimated Fluid Needs: ESLD  Justification: Per provider pending fluid status    PHYSICAL FINDINGS  See malnutrition section below.    MALNUTRITION  % Intake: decrease intake does not meet criteria   % Weight Loss: None noted  Subcutaneous Fat Loss: None observed  Muscle Loss: Mild facial   Fluid Accumulation/Edema: Ascites   Malnutrition Diagnosis: Non-severe malnutrition in the context of acute on chronic condition     NUTRITION DIAGNOSIS  Inadequate oral intake related to low appetite as evidenced by variable PO intake, reliant on oral nutrition drinks to provide for adequate/consistent intake     INTERVENTIONS  Implementation  Nutrition Education: encouraged small frequent meals + oral supplements to meet estimated needs   Medical food supplement therapy: changed to boost plus      Goals  Patient to consume % of nutritionally adequate meal trays TID, or the equivalent with supplements/snacks.     Monitoring/Evaluation  Progress toward goals will be monitored and evaluated per protocol.    Sarah Galvan RD/ROZ  Pager [phone]

## 2019-11-18 NOTE — PLAN OF CARE
"BP (!) 92/32 (BP Location: Left arm)   Pulse 101   Temp 96.8  F (36  C) (Oral)   Resp 16   Ht 1.727 m (5' 8\")   Wt 93.5 kg (206 lb 1.6 oz)   SpO2 94%   BMI 31.34 kg/m    Hypotensive and tachycardic. Assumed cares at [phone].Alert/oriented x4. Pt able to communicate needs, but speaks slowly an moves slowly. Critical lab: platelets @ 49 and provider notified at 2015. Patient medicated with 4mg Dilaudid q 3 hours PRN  x2 for RUQ abdominal pain.  Jaundice skin with trace edematous bilateral  lower extremities. Patient up with SBA to BR and voided  715cc deborah urine. Calorie Counts maintained and patient/spouse documenting intake  with receipts and notes on white board. Blood glucose 100 @ 0200. Strict I & 0s- hat in toilet and patient and spouse notifying RN re:  intake.  Trace LE edema. No BMs overnight.  2 LUE PIVs patent, saline locked.  Spouse in bed with patient watching movie and supportive of patient.Plan: Hourly rounding complete,call light within reach. Continue to monitor pain, VS,and labs.  Anticipate discharge when medically stable back to her permanent supportive housing in [place], [place].Notify MD of changes.      "

## 2019-11-18 NOTE — PROGRESS NOTES
Calorie Count  Intake recorded for: 11/17  Total Kcals: 1026 Total Protein: 32g  Kcals from Hospital Food: 1026  Protein: 32g  Kcals from Outside Food (average):0 Protein: 0g  # Meals Recorded: 2 meals (First - 100% 2 fruit cups, 2 jellos, fruit ice, 2 milks)      (Second - 100% 2 fruit cups, 2 jellos, 50% cod w/ cheese & 2 slices of bread)  # Supplements Recorded: 50% 1 Boost Shake

## 2019-11-19 ENCOUNTER — PATIENT OUTREACH (OUTPATIENT)
Dept: CARE COORDINATION | Facility: CLINIC | Age: 44
End: 2019-11-19

## 2019-11-19 VITALS
DIASTOLIC BLOOD PRESSURE: 47 MMHG | BODY MASS INDEX: 31.57 KG/M2 | SYSTOLIC BLOOD PRESSURE: 120 MMHG | TEMPERATURE: 97.1 F | HEIGHT: 68 IN | RESPIRATION RATE: 16 BRPM | WEIGHT: 208.3 LBS | HEART RATE: 106 BPM | OXYGEN SATURATION: 94 %

## 2019-11-19 LAB
BILIRUB SERPL-MCNC: 23.4 MG/DL (ref 0.2–1.3)
GLUCOSE BLDC GLUCOMTR-MCNC: 145 MG/DL (ref 70–99)
HCV GENTYP SERPL NAA+PROBE: NORMAL
HCV RNA SERPL NAA+PROBE-ACNC: ABNORMAL [IU]/ML
HCV RNA SERPL NAA+PROBE-LOG IU: 4.9 LOG IU/ML

## 2019-11-19 PROCEDURE — 99238 HOSP IP/OBS DSCHRG MGMT 30/<: CPT | Performed by: INTERNAL MEDICINE

## 2019-11-19 PROCEDURE — 82247 BILIRUBIN TOTAL: CPT | Performed by: NURSE PRACTITIONER

## 2019-11-19 PROCEDURE — 25000132 ZZH RX MED GY IP 250 OP 250 PS 637: Performed by: NURSE PRACTITIONER

## 2019-11-19 PROCEDURE — 25000132 ZZH RX MED GY IP 250 OP 250 PS 637: Performed by: STUDENT IN AN ORGANIZED HEALTH CARE EDUCATION/TRAINING PROGRAM

## 2019-11-19 PROCEDURE — 36415 COLL VENOUS BLD VENIPUNCTURE: CPT | Performed by: NURSE PRACTITIONER

## 2019-11-19 PROCEDURE — 00000146 ZZHCL STATISTIC GLUCOSE BY METER IP

## 2019-11-19 PROCEDURE — 25000128 H RX IP 250 OP 636: Performed by: STUDENT IN AN ORGANIZED HEALTH CARE EDUCATION/TRAINING PROGRAM

## 2019-11-19 RX ADMIN — HYDROMORPHONE HYDROCHLORIDE 4 MG: 2 TABLET ORAL at 04:45

## 2019-11-19 RX ADMIN — THERA TABS 1 TABLET: TAB at 08:14

## 2019-11-19 RX ADMIN — GABAPENTIN 300 MG: 300 CAPSULE ORAL at 08:14

## 2019-11-19 RX ADMIN — PANTOPRAZOLE SODIUM 40 MG: 40 TABLET, DELAYED RELEASE ORAL at 08:14

## 2019-11-19 RX ADMIN — CEFTRIAXONE 1 G: 1 INJECTION, POWDER, FOR SOLUTION INTRAMUSCULAR; INTRAVENOUS at 08:16

## 2019-11-19 RX ADMIN — RIFAXIMIN 550 MG: 550 TABLET ORAL at 08:14

## 2019-11-19 RX ADMIN — HYDROMORPHONE HYDROCHLORIDE 4 MG: 2 TABLET ORAL at 08:13

## 2019-11-19 RX ADMIN — GABAPENTIN 300 MG: 300 CAPSULE ORAL at 01:49

## 2019-11-19 RX ADMIN — HYDROMORPHONE HYDROCHLORIDE 4 MG: 2 TABLET ORAL at 01:47

## 2019-11-19 RX ADMIN — LACTULOSE 30 ML: 20 SOLUTION ORAL at 08:14

## 2019-11-19 ASSESSMENT — ACTIVITIES OF DAILY LIVING (ADL)
ADLS_ACUITY_SCORE: 13
ADLS_ACUITY_SCORE: 13
ADLS_ACUITY_SCORE: 15

## 2019-11-19 NOTE — PROGRESS NOTES
Social Work Services Progress Note    Hospital Day: 9  Date of Initial Social Work Evaluation:  See unit LUCINA and RN CC's note  Collaborated with:  5B Charge RN; patient;  Pt's  Boubacar; pt's RN Echo    Data:  On-call LUCINA paged at 1630 stating that pt is being discharged tomorrow morning via bus back home to [place].  Pt and  have questions about meals and a social security check.    Intervention:  Chart reviewed.  LUCINA met with pt, pt's  who is in bed with pt, and Echo, pt's RN.  Pt and Boubacar's understanding is that pt will be ready to discharge tomorrow and that bus tickets will be purchased and provided for them in order to get back to [place], [place].  Boubacar states that they have routed pt's social security check to arrive in the mail here in the hospital rather than at pt's home.  PHUONG Dodge, went out to check the units mailbox and reported back to pt and Boubacar that there was no mail here for the pt.  Boubacar states that without this check they have no money and they will need food and beverages for the 10-hour trip back to [place], [place].  LUCINA tells pt and  that writer will check on the status of the bus tickets and talk with the MD first and report back.    LUCINA talked with Dr. Nolen, [facility] crosscover, 1221 re: above discharge plan.  His understanding is that it is anticipated pt will be ready to discharge tomorrow.      LUCINA talked with PHUONG Hernandez and JAMIE Mendoza who are currently off-site but are familiar with pt.  PHUONG Hernandez, states that notification has been sent to our Accommodation office who purchases the bus tickets with request for tomorrow's ride.  According to the schedule writer can see, the bus leaves at 11:35am.  It is also known that pt and Boubacar will need a taxi to the bus station.  PHUONG Hernandez, will follow up tomorrow morning.    LUCINA met back with pt and Boubacar and reported the above.  Boubacar asks for a meal ticket for breakfast  tomorrow.  SW provided him with one meal ticket.  SW asked how pt plans to get to her apt from the bus station at [place].  She states she has a ride but did not elaborate on details.  Boubacar tells writer that pt is asking for her bilirubin to be re-checked prior to discharge to make sure she is stable.    LUCINA relayed the above request for a bilirubin re-check to Dr. Nolen, 1221.    Assessment:  Discharge planning/transportation home coordination.    Plan:    Anticipated Disposition:  Anticipated to be ready to discharge tomorrow, Tues.    Barriers to d/c plan:  Medical clearance.    Follow Up:  On-call SW available until midnight, then unit RN BRIANNA and LUCINA come in at 8am.    DOMINGUEZ Stevenson  Social Work Services  Emergency Department   [phone] phone  [phone] pager  9:00am-9:30pm Mon-Sat    On-call pager, [phone], 4:00pm to midnight

## 2019-11-19 NOTE — DISCHARGE SUMMARY
[facility], [place]  Hospitalist Discharge Summary       Date of Admission:  11/10/2019  Date of Discharge:  11/19/2019  Discharging Provider: Viral Hernandez MD  Discharge Team: Hospitalist Service, Gold 8    Discharge Diagnoses   HCV active infection, decompensated cirrhosis, alcoholic cirrhosis, hyponatremia, acute kidney injury, E. coli UTI, anemia, gastritis    Follow-ups Needed After Discharge   Follow-up Appointments     Adult [facility] Follow-up and recommended labs and tests      F/u with PCP within 7 days with CBC, CMP, INR   Establish with hepatology close to home to start treatment for hepatitis C  Establish with chemical dependency outpatient center close to home      Appointments on [place] and/or [facility] (with [facility] or [facility]   provider or service). Call [phone] if you haven't heard regarding   these appointments within 7 days of discharge.             Unresulted Labs Ordered in the Past 30 Days of this Admission     Date and Time Order Name Status Description    11/14/2019 0934 Blood culture Preliminary     11/14/2019 0934 Blood culture Preliminary     11/11/2019 1237 Hepatitis C High Resolution Genotype In process     11/11/2019 1237 Hepatitis C RNA Quantitative In process       These results will be followed up by IM and PCP    Discharge Disposition   Discharged to home  Condition at discharge: Stable    Hospital Course   This is a 44-year-old female with past medical history of alcohol abuse, cirrhosis, diastolic heart failure [facility]  Patient was previously seen and  treated at outlying facility for several days.  Patient was initially started on steroids secondary to elevated mattress discriminant function.  This was discontinued as patient had elevated lil score.  Patient was transferred for purposes of acute viral hepatitis.  Patient had market elevation of transaminases in addition to bilirubin.   Transaminases down trended throughout hospital stay.  Hematology saw and evaluated patient.  Patient was started on lactulose and rifaximin.  Alcohol cessation was recommended.  Urinary tract infection was treated with a 5-day course of ceftriaxone which was discontinued on 11/19/2019.  Symptomatic improvement was noted throughout hospital stay.  Patient was recommended to continue 2 g sodium restriction.  Patient was not overtly fluid overloaded throughout hospital stay. Patient will need to establish with hepatology in [place].    Consultations This Hospital Stay   GI HEPATOLOGY ADULT IP CONSULT  SPIRITUAL HEALTH SERVICES IP CONSULT  CHEMICAL DEPENDENCY IP CONSULT  NUTRITION SERVICES ADULT IP CONSULT  OCCUPATIONAL THERAPY ADULT IP CONSULT  PHYSICAL THERAPY ADULT IP CONSULT  INTERNAL MEDICINE PROCEDURE TEAM ADULT IP CONSULT [facility]  VASCULAR ACCESS CARE ADULT IP CONSULT  VASCULAR ACCESS CARE ADULT IP CONSULT    Code Status   Full Code    Time Spent on this Encounter   I, Viral Hernandez MD, personally saw the patient today and spent less than or equal to 30 minutes discharging this patient.       Viral Hernandez MD  [facility], [place]  ______________________________________________________________________    Physical Exam   Vital Signs: Temp: 97.1  F (36.2  C) Temp src: Oral BP: 120/47 Pulse: 106   Resp: 16 SpO2: 94 % O2 Device: None (Room air)    Weight: 208 lbs 4.8 oz  Physical Exam  Constitutional:       Appearance: Normal appearance.   HENT:      Head: Normocephalic and atraumatic.   Eyes:      General: Scleral icterus present.      Pupils: Pupils are equal, round, and reactive to light.   Cardiovascular:      Rate and Rhythm: Normal rate and regular rhythm.      Heart sounds: No murmur. No friction rub. No gallop.    Pulmonary:      Effort: Pulmonary effort is normal. No respiratory distress.      Breath sounds: No wheezing, rhonchi or rales.   Abdominal:       General: Abdomen is flat. There is no distension.      Palpations: Abdomen is soft.      Tenderness: There is no abdominal tenderness. There is no guarding.   Musculoskeletal:      Right lower leg: No edema.      Left lower leg: No edema.   Skin:     Coloration: Skin is jaundiced.   Neurological:      General: No focal deficit present.      Mental Status: She is alert and oriented to person, place, and time.   Psychiatric:         Mood and Affect: Mood normal.         Behavior: Behavior normal.       Primary Care Physician   JUNI CLARK    Discharge Orders      Reason for your hospital stay    You came in with alcoholic hepatitis and found to have hepatitis C infection.  You will need to stop drinking alcohol completely and establish with a liver doctor close to your home to start treatment for hepatitis C.     Adult [facility] Follow-up and recommended labs and tests    F/u with PCP within 7 days with CBC, CMP, INR   Establish with hepatology close to home to start treatment for hepatitis C  Establish with chemical dependency outpatient center close to home      Appointments on [place] and/or [facility] (with [facility] or [facility] provider or service). Call [phone] if you haven't heard regarding these appointments within 7 days of discharge.     Activity    Your activity upon discharge: activity as tolerated     Diet    2g sodium restricted diet and 2L fluid restricted diet       Significant Results and Procedures   Results for orders placed or performed during the hospital encounter of 11/10/19   US Abdomen Limited w Doppler Complete    Narrative    EXAMINATION: US ABDOMEN LIMITED WITH DOPPLER COMPLETE 11/11/2019 8:39  AM     COMPARISON: No images available. Report from abdominal ultrasound from  11/1/2019 in Care Everywhere.    HISTORY: 44-year-old female with decompensated cirrhosis. Evaluate for  portal vein thrombus.    TECHNIQUE: The abdomen was scanned in standard fashion with  specialized ultrasound  transducer(s) using both gray-scale, color  Doppler, and spectral flow techniques.    Findings:  Liver: The liver measures 11.0 cm. The liver demonstrates a coarsened  echotexture with mild surface nodularity. No evidence of a focal  hepatic mass. The main portal vein is patent and measures 0.6 cm.    Extrahepatic portal vein flow is retrograde at 29 cm/s.  Right portal vein flow is retrograde, measuring 30 cm/s.  Left portal vein flow is retrograde, measuring 72 cm/s.    Flow in the hepatic artery is towards the liver and:  159 cm/s peak systolic  0.69 resistive index.     The splenic vein is not well visualized. The left, middle, and right  hepatic veins are patent with flow towards the IVC. The IVC is patent  with flow towards the heart.    Gallbladder: Gallbladder appears dilated. There is no wall thickening,  pericholecystic fluid, positive sonographic Arenas's sign, or evidence  for cholelithiasis.    Bile Ducts: No intrahepatic biliary dilation. The common bile duct is  mildly dilated, measuring 7 mm (similar to ultrasound from 11/1/2019  where the common bile duct measured 7 mm). No evidence of  choledocholithiasis.    Pancreas: Not well visualized on this exam.    Kidneys: The right kidney measures 13.0 cm. There is no  hydronephrosis, shadowing renal stones, or focal renal mass.    Fluid: Mild ascites.      Impression    Impression:   1.  No sonographic evidence of portal venous thrombus as questioned.  2.  Cirrhotic appearing liver with sequelae of portal hypertension  with mild ascites and reversal of flow in all of the portal veins.  3.  Pancreas and splenic vein are not well-seen on this exam.    I have personally reviewed the examination and initial interpretation  and I agree with the findings.    PORTER MAIN MD   XR Abdomen Port 1 View    Narrative    Exam: XR ABDOMEN PORT 1 VW, 11/13/2019 10:28 AM    Indication: Nausea/vomiting    Comparison: None    Findings:   Nonobstructive bowel gas pattern.  No free air or pneumatosis. There  are small punctate calcifications throughout the right side of the  abdomen. Although the images are not available on outside report from  a CT of 9/6/2019 does not cardiac about calcifications. This likely is  in the patient's colon related to medication.      Impression    Impression: Nonobstructive bowel gas pattern. Calcifications in the  right abdomen of unknown significance. There is an outside report that  does not talk about calcifications. Likely of no clinical  significance. Direct comparison with those images might be helpful.    ELISSA GARZA MD   POC US Guide for Paracentesis    Impression    Brief CAPS Consult Note    I was contacted to evaluate Jessie Grayson for possible bedside diagnostic paracentesis.  A bedside ultrasound was performed and noted:    RUQ: Trace ascites  RLQ: Trace ascites  LUQ: Trace ascites  LLQ: Trace ascites    Additional images obtained:  RUQ posterior to ribs: ascites located superior to liver but posterior to ribs    Given only trace ascites or pockets posterior to ribs, no safe location for bedside diagnostic paracentesis was found.  If high clinical suspicion or ongoing need for sample, may review with IR if they would attempt to sample the suprahepatic fluid collection.    Images saved to the chart.  Please contact us with questions or concerns.    Diana Bardales MD  CAPS Team  11/14/2019    XR Chest 2 Views    Narrative    XR CHEST 2 VW  11/15/2019 1:41 PM      HISTORY: Please look pleural effusion, PNA    COMPARISON: None    FINDINGS: Frontal and lateral views of the chest. The cardiac  silhouette size is within normal limits. There is no significant  pleural effusion or pneumothorax. There are no acute airspace  opacities. The visualized upper abdomen is unremarkable. No acute  osseous abnormality.       Impression    IMPRESSION: No acute cardiopulmonary abnormality.     I have personally reviewed the examination and initial  interpretation  and I agree with the findings.    GWENDOLYN SUERO MD   XR Chest Decub Views Bilateral    Narrative    XR CHEST SPECIALITY VIEWS BILATERAL  11/15/2019 1:41 PM      HISTORY: Eval for Pleural effusion    COMPARISON: Chest radiograph from today    FINDINGS: 2 decubitus views of the chest. Both breasts partially  obscure the lung fields. Cardiac silhouette is within normal limits.  No significant pleural effusion or pneumothorax. Visualized upper  abdomen is unremarkable.      Impression    IMPRESSION: No significant pleural effusion identified.    I have personally reviewed the examination and initial interpretation  and I agree with the findings.    GWENDOLYN SUERO MD   Echo Complete    Narrative    [id number]  [id number]  [id number]  [id number]^RESHMA^NORMAN           [facility]  Echocardiography Laboratory  [address] [place] [id number]     Name: EITAN BECERRIL  MRN: [id number]  : 1975  Study Date: 2019 11:14 AM  Age: 44 yrs  Gender: Female  Patient Location: [facility]  Reason For Study: Cirrhosis  Ordering Physician: NORMAN JUSTICE  Performed By: Edith Jalloh RDCS, RVT     BSA: 2.0 m2  Height: 68 in  Weight: 193 lb  HR: 101  BP: 120/46 mmHg  _____________________________________________________________________________  __        Procedure  Complete Portable Echo Adult.  _____________________________________________________________________________  __        Interpretation Summary  Global and regional left ventricular function is hyperkinetic with an EF >70%.  Right ventricular function, chamber size, wall motion, and thickness are  normal.  Pulmonary artery systolic pressure cannot be assessed.  The inferior vena cava is normal.  No pericardial effusion is present.  _____________________________________________________________________________  __        Left Ventricle  Global and regional left ventricular function is hyperkinetic with an EF >70%.  Left  ventricular wall thickness is normal. Left ventricular size is normal.  Left ventricular diastolic function is normal. No regional wall motion  abnormalities are seen.     Right Ventricle  Right ventricular function, chamber size, wall motion, and thickness are  normal.     Atria  Both atria appear normal.     Mitral Valve  The mitral valve is normal.        Aortic Valve  Aortic valve is normal in structure and function.     Tricuspid Valve  The tricuspid valve is normal. Trace tricuspid insufficiency is present.  Pulmonary artery systolic pressure cannot be assessed.     Pulmonic Valve  The pulmonic valve is normal.     Vessels  The aorta root is normal. The pulmonary artery and bifurcation cannot be  assessed. The inferior vena cava is normal.     Pericardium  No pericardial effusion is present.        Compared to Previous Study  Previous study not available for comparison.  _____________________________________________________________________________  __  MMode/2D Measurements & Calculations     IVSd: 0.94 cm  LVIDd: 4.7 cm  LVIDs: 2.3 cm  LVPWd: 1.0 cm  FS: 51.9 %  LV mass(C)d: 160.2 grams  LV mass(C)dI: 79.6 grams/m2  Ao root diam: 3.2 cm  asc Aorta Diam: 2.8 cm  LVOT diam: 2.1 cm  LVOT area: 3.5 cm2  LA Volume (BP): 62.3 ml  LA Volume Index (BP): 31.0 ml/m2  RWT: 0.43           Doppler Measurements & Calculations  MV E max melania: 76.3 cm/sec  MV A max melania: 88.7 cm/sec  MV E/A: 0.86  MV dec slope: 1210 cm/sec2  PA acc time: 0.11 sec  E/E' av.1  Lateral E/e': 8.0  Medial E/e': 10.3     _____________________________________________________________________________  __           Report approved by: Alma El 2019 12:16 PM            Discharge Medications   Current Discharge Medication List      START taking these medications    Details   HYDROmorphone (DILAUDID) 2 MG tablet Take 1 tablet (2 mg) by mouth every 3 hours as needed for moderate to severe pain  Qty: 15 tablet, Refills: 0    Associated  Diagnoses: Hepatitis      pantoprazole (PROTONIX) 40 MG EC tablet Take 1 tablet (40 mg) by mouth daily  Qty: 60 tablet, Refills: 1    Associated Diagnoses: Gastroesophageal reflux disease without esophagitis      rifaximin (XIFAXAN) 550 MG TABS tablet Take 1 tablet (550 mg) by mouth 2 times daily  Qty: 100 tablet, Refills: 1    Associated Diagnoses: Alcoholic cirrhosis of liver with ascites (H); Hepatic encephalopathy (H)         CONTINUE these medications which have CHANGED    Details   gabapentin (NEURONTIN) 300 MG capsule Take 1-2 capsules (300-600 mg) by mouth 3 times daily :300 mg by mouth in the morning, 300 mg by mouth in the afternoon, 600 mg by mouth in the evening  Qty: 180 capsule, Refills: 1    Associated Diagnoses: Chronic pain syndrome      lactulose (CHRONULAC) 10 GM/15ML solution Take 30 mLs by mouth 2 times daily  Qty: 946 mL, Refills: 1    Associated Diagnoses: Alcoholic cirrhosis of liver with ascites (H)      multivitamin, therapeutic (THERA-VIT) TABS tablet Take 1 tablet by mouth daily  Qty: 30 tablet, Refills: 1    Associated Diagnoses: Alcoholic cirrhosis of liver with ascites (H)         STOP taking these medications       famotidine (PEPCID) 40 MG tablet Comments:   Reason for Stopping:         furosemide (LASIX) 40 MG tablet Comments:   Reason for Stopping:         spironolactone (ALDACTONE) 50 MG tablet Comments:   Reason for Stopping:             Allergies   Allergies   Allergen Reactions     Codeine Rash

## 2019-11-19 NOTE — PROGRESS NOTES
Care Coordinator Discharge Note    Admission Date/Time:  11/10/2019  Attending MD:  Jasmin Cat MD    Data  Chart reviewed, discussed with interdisciplinary team.   Patient was admitted for:    Alcoholic cirrhosis of liver with ascites (H)  Chronic pain syndrome  Gastroesophageal reflux disease without esophagitis  Hepatitis.    Concerns with insurance coverage for discharge needs: insurance coverage is inadequate; [employer]  Current Living Situation: Patient permanent supportive housing in [facility]  Support System: questionable - please see description below  Services Involved: none  Transportation at Discharge: None  Transportation to Medical Appointments:  - spouse  Barriers to Discharge: none       Coordination of Care    Called to update patients RN Medical Case Manager Ingris (Ph: [phone]), left voicemail regarding discharge plans, bus ticket, taxi cab, courtesy meals, etc.    Per requirements previously discussed with EMILY Amado patient needs to be 100% independent to return to her supportive apartment. Nursing notes were reviewed this morning, patients appears to meet this qualifier.       Bus tickets for patient and spouse were purchased yesterday by [employer]. Notified [place] cover MD at 725am that signed discharge orders need to completed by 8/830am. Discharge medications are already in the  med room; bedside RN will give these to patient when dc paperwork is reviewed. Bedside RN to provide two courtesy meals and milk or juice to patient and spouse at that time as well. Taxi ride is arranged for 930am as they must arrive and check in with bus station staff one hour prior to departure. Bus leaves at 1135am.      Taxi confirmation #[id number]    Addendum 1007 am  Patient and spouse missed the first taxi ride, it arrived at 936am. Requested another taxi, will arrive in 10 minutes. Second confirmation #[id number]. Notified bedside RN.    Addendum 1130  am  Received a call from the spouse who reports their bus was cancelled and rescheduled for tomorrow. They have been given replacement tickets. Spouse reports they have no where to go, no money, no food, no identification, and dont know what to do; patient is having a panic attack.     Using hotel information provided by [employer] Specialist RNCC booked a one overnight, single bedroom, at the [facility] on [place] ([address] [facility] [address] Ph: [phone]). Confirmation #[id number]. Registration person is aware, and has checked with mgmt that it is okay, neither party have a photo ID for check in.    Taxi arranged to  patient and spouse from [place] ([address] [place] [address]) and bring them to the [facility] address listed above. Confirmation #[id number]    [facility] does not have a shuttle that can bring them to [facility] in the morning; will need another taxi arranged.     Plan  Anticipated Discharge Date:  Tuesday 11/19/19  Anticipated Discharge Plan:  Home to [facility] via bus (patient reports she has transport from [facility] bus station to her home)    Mary Hardy RN, BSN, PHN  Internal Medicine Care Coordinator  [facility]  Desk Phone: [phone]  Pager: [phone]

## 2019-11-19 NOTE — PLAN OF CARE
"Assumed cares 0700 until discharge.    /47 (BP Location: Left arm)   Pulse 106   Temp 97.1  F (36.2  C) (Oral)   Resp 16   Ht 1.727 m (5' 8\")   Wt 94.5 kg (208 lb 4.8 oz)   SpO2 94%   BMI 31.67 kg/m      Pt medically ready for discharge. AM meds admin in addition to 4 mg dilaudid for abd pain. PIV removed.     Reviewed discharge instructions with pt and family, both verbalized understanding and aware of necessary follow up. Pt left unit around 0915 with spouse via wheelchair--headed for lobby to meet taxi. Pt/spouse left unit with bus tickets (taxi driving them to bus station), discharge instructions, medications, and 2 courtesy meals (for bus ride).      "

## 2019-11-19 NOTE — PLAN OF CARE
"    /47 (BP Location: Left arm)   Pulse 106   Temp 97.1  F (36.2  C) (Oral)   Resp 16   Ht 1.727 m (5' 8\")   Wt 94.5 kg (208 lb 4.8 oz)   SpO2 94%   BMI 31.67 kg/m   Hypotensive and tachycardic. Assumed cares at [id number]. Alert/oriented x4. Pt able to communicate needs, but speaks slowly an moves slowly. Jaundice skin with trace edematous bilateral  lower extremities.  Patient medicated with 4mg Dilaudid q 3 hours PRN  x1 for RUQ abdominal pain.Calorie Counts maintained and patient/spouse documenting intake  with receipts and notes on white board. Blood glucose 145 @ 0200.  Bilirubin 23.4. Sodium 129, Strict I & 0s-  Trace LE edema. No BMs overnight. Patient up with SBA to BR and voided  715cc deborah urine Spouse in bed with patient watching movie and supportive of patient  Patient and spouse plan for discontinue in am with arrangements by CC LUCINA.Plan: Hourly rounding complete,call light within reach. Continue to monitor pain, VS,and labs.  Anticipate discharge when medically stable back to her permanent supportive housing in [place], [place].Notify MD of changes.     "

## 2019-11-19 NOTE — PLAN OF CARE
Pt is sleeping between cares and meals.  lying in the bed with her. Both appear to be eating off pt's meal trays but pt appears to be eating well. She is jaundiced but appears oriented but did have the wrong day of the week. Pt is able to walk to the restroom by herself as noted by writer. Pt and  requested to speak with  to check if transportation was set up for tomorrow. The on call  came in and the plan is for them to be provided with taxi to the bus station and the bus leaves at 11:35 am. We can send courtesy meals with them on the bus. The discharge mediations were picked up by the charge RN and were placed in the med room. Pt asked for pain medication every 3 hours for abdominal pain. She took other medication as scheduled.

## 2019-11-20 LAB
BACTERIA SPEC CULT: NO GROWTH
BACTERIA SPEC CULT: NO GROWTH
Lab: NORMAL
Lab: NORMAL
SPECIMEN SOURCE: NORMAL
SPECIMEN SOURCE: NORMAL

## 2019-11-20 NOTE — PROGRESS NOTES
Attempted to contact patient at the number provided, however, the gentleman who answered the phone was not familiar with the patient. Will close post-hospital call at this time.

## 2019-11-24 NOTE — PROGRESS NOTES
[facility]  Transfer Triage Note    Date of call: 11/23/19  Time of call: 11:43 PM    Reason for Transfer:Not yet accepted for transfer  Diagnosis: anemia    Outside Records: Not available  Additional records requested to be faxed to [phone].    Stability of Patient: Patient is vitally stable, with no critical labs, and will likely remain stable throughout the transfer process    Expected Time of Arrival for Transfer: Not accepted    Recommendations for Management and Stabilization: Given : Dr Velasquez from [facility] GI rec to consult their GI for possible GIB work up. If they determine that the patient needs higher level of care, they will initiate discussion about transfer (another communication possibly on Monday 11/25)    Additional Comments 44F recently admitted to [facility] 11/10-19, alcoholic hepatitis. Returned to [facility]. Noted to have worsening anemia. Provider their Dr Issa contacted GI.  GI Dr Velasquez rec local GI consult first. If the GI consultant there feels strongly about higher level of care, possible transfer at that time.    Marce Queen MD     Addendum at 11/25:  44-year-old female admitted to [facility] from 11/10-19 for alcoholic hepatitis. Patient has been currently admitted to [facility].  Known hepatitis C infection would be causing hepatitis as well.  Now presenting with worsening total bilirubin to 30.8, direct bilirubin to 13.7.  Creatinine has been stable.  Hemoglobin to 9.1. Based on reports from hospitalist patient has been sober for past 2 months.  Patient had some nasal bleeding.  But otherwise no concern for GI bleeding.  Currently diagnostic para is on hold due to elevated INR.  Otherwise patient is vitally stable and mentating well. Patient currently is not a transplant candidate but the hospitalist there does not feel comfortable taking care of the patient and also requesting transfer for higher level of care. This case discussed with   Rosangelaan from hepatology was clear that she is not a transplant candidate and would not need liver biopsy. But if patient needs to be transferred for higher level of care then it is appropriate. Also she is known to our medicine and GI and was recently discharged from [facility] on 11/19. Based on hospitalist there GI over there also recommended transfer for higher level of care.     We will accept this patient for med/surg bed.    Addendum on 11/29:  Patient now had prior authorization completed (per patient placement). Called for updated handoff.   Bilirubin 36. INR improved to 3.8. Mentating ok, on lactulose. Vitals stable. Creatinine normal.   Transfer is to allow her to see a transplant hepatologist. Patient and family are aware she is not a transplant candidate at this time.    Brianna Triplett MD

## 2019-11-30 ENCOUNTER — TRANSFERRED RECORDS (OUTPATIENT)
Dept: HEALTH INFORMATION MANAGEMENT | Facility: CLINIC | Age: 44
End: 2019-11-30

## 2019-11-30 LAB
ALT SERPL-CCNC: 40 U/L (ref 7–55)
AST SERPL-CCNC: 50 U/L (ref 5–34)
CREAT SERPL-MCNC: 0.7 MG/DL (ref 0.6–1.3)
GFR SERPL CREATININE-BSD FRML MDRD: >60 ML/MIN/1.73 SQ M
GLUCOSE SERPL-MCNC: 106 MG/DL (ref 70–99)
POTASSIUM SERPL-SCNC: 4.4 MMOL/L (ref 3.6–5.5)

## 2019-12-01 ENCOUNTER — HOSPITAL ENCOUNTER (INPATIENT)
Facility: CLINIC | Age: 44
LOS: 1 days | Discharge: HOME HEALTH | DRG: 433 | End: 2019-12-02
Attending: INTERNAL MEDICINE | Admitting: PEDIATRICS
Payer: COMMERCIAL

## 2019-12-01 DIAGNOSIS — R11.0 NAUSEA: ICD-10-CM

## 2019-12-01 DIAGNOSIS — K75.9 HEPATITIS: ICD-10-CM

## 2019-12-01 DIAGNOSIS — Z71.6 ENCOUNTER FOR SMOKING CESSATION COUNSELING: Primary | ICD-10-CM

## 2019-12-01 DIAGNOSIS — G89.4 CHRONIC PAIN SYNDROME: ICD-10-CM

## 2019-12-01 PROBLEM — K92.2 GI BLEED: Status: ACTIVE | Noted: 2019-12-01

## 2019-12-01 LAB
ABO + RH BLD: NORMAL
ABO + RH BLD: NORMAL
ALBUMIN SERPL-MCNC: 2.8 G/DL (ref 3.4–5)
ALP SERPL-CCNC: 128 U/L (ref 40–150)
ALT SERPL W P-5'-P-CCNC: 46 U/L (ref 0–50)
ANION GAP SERPL CALCULATED.3IONS-SCNC: 7 MMOL/L (ref 3–14)
AST SERPL W P-5'-P-CCNC: 54 U/L (ref 0–45)
BILIRUB DIRECT SERPL-MCNC: 20.7 MG/DL (ref 0–0.2)
BILIRUB SERPL-MCNC: 27.4 MG/DL (ref 0.2–1.3)
BLD GP AB SCN SERPL QL: NORMAL
BLD PROD TYP BPU: NORMAL
BLD UNIT ID BPU: 0
BLD UNIT ID BPU: 0
BLOOD BANK CMNT PATIENT-IMP: NORMAL
BLOOD PRODUCT CODE: NORMAL
BLOOD PRODUCT CODE: NORMAL
BPU ID: NORMAL
BPU ID: NORMAL
BUN SERPL-MCNC: 7 MG/DL (ref 7–30)
CALCIUM SERPL-MCNC: 8.6 MG/DL (ref 8.5–10.1)
CHLORIDE SERPL-SCNC: 104 MMOL/L (ref 94–109)
CO2 SERPL-SCNC: 20 MMOL/L (ref 20–32)
CREAT SERPL-MCNC: 0.73 MG/DL (ref 0.52–1.04)
ERYTHROCYTE [DISTWIDTH] IN BLOOD BY AUTOMATED COUNT: 27.6 % (ref 10–15)
FIBRINOGEN PPP-MCNC: 111 MG/DL (ref 200–420)
FIBRINOGEN PPP-MCNC: 84 MG/DL (ref 200–420)
FIBRINOGEN PPP-MCNC: <61 MG/DL (ref 200–420)
GFR SERPL CREATININE-BSD FRML MDRD: >90 ML/MIN/{1.73_M2}
GLUCOSE SERPL-MCNC: 111 MG/DL (ref 70–99)
HCT VFR BLD AUTO: 31.6 % (ref 35–47)
HGB BLD-MCNC: 10.1 G/DL (ref 11.7–15.7)
INR PPP: 4.42 (ref 0.86–1.14)
MCH RBC QN AUTO: 28.1 PG (ref 26.5–33)
MCHC RBC AUTO-ENTMCNC: 32 G/DL (ref 31.5–36.5)
MCV RBC AUTO: 88 FL (ref 78–100)
NUM BPU REQUESTED: 5
NUM BPU REQUESTED: 5
PLATELET # BLD AUTO: 47 10E9/L (ref 150–450)
POTASSIUM SERPL-SCNC: 3.8 MMOL/L (ref 3.4–5.3)
PROT SERPL-MCNC: 5.9 G/DL (ref 6.8–8.8)
RBC # BLD AUTO: 3.59 10E12/L (ref 3.8–5.2)
SODIUM SERPL-SCNC: 132 MMOL/L (ref 133–144)
SPECIMEN EXP DATE BLD: NORMAL
TRANSFUSION STATUS PATIENT QL: NORMAL
WBC # BLD AUTO: 8.9 10E9/L (ref 4–11)

## 2019-12-01 PROCEDURE — 85027 COMPLETE CBC AUTOMATED: CPT | Performed by: HOSPITALIST

## 2019-12-01 PROCEDURE — 0W9G3ZZ DRAINAGE OF PERITONEAL CAVITY, PERCUTANEOUS APPROACH: ICD-10-PCS | Performed by: INTERNAL MEDICINE

## 2019-12-01 PROCEDURE — 82248 BILIRUBIN DIRECT: CPT | Performed by: STUDENT IN AN ORGANIZED HEALTH CARE EDUCATION/TRAINING PROGRAM

## 2019-12-01 PROCEDURE — 76705 ECHO EXAM OF ABDOMEN: CPT | Performed by: INTERNAL MEDICINE

## 2019-12-01 PROCEDURE — 85610 PROTHROMBIN TIME: CPT | Performed by: HOSPITALIST

## 2019-12-01 PROCEDURE — 85384 FIBRINOGEN ACTIVITY: CPT | Performed by: INTERNAL MEDICINE

## 2019-12-01 PROCEDURE — 36415 COLL VENOUS BLD VENIPUNCTURE: CPT | Performed by: INTERNAL MEDICINE

## 2019-12-01 PROCEDURE — 40000344 ZZHCL STATISTIC THAWING COMPONENT: Performed by: STUDENT IN AN ORGANIZED HEALTH CARE EDUCATION/TRAINING PROGRAM

## 2019-12-01 PROCEDURE — P9012 CRYOPRECIPITATE EACH UNIT: HCPCS | Performed by: PEDIATRICS

## 2019-12-01 PROCEDURE — 12000001 ZZH R&B MED SURG/OB UMMC

## 2019-12-01 PROCEDURE — 36415 COLL VENOUS BLD VENIPUNCTURE: CPT | Performed by: HOSPITALIST

## 2019-12-01 PROCEDURE — 80053 COMPREHEN METABOLIC PANEL: CPT | Performed by: HOSPITALIST

## 2019-12-01 PROCEDURE — 86900 BLOOD TYPING SEROLOGIC ABO: CPT | Performed by: STUDENT IN AN ORGANIZED HEALTH CARE EDUCATION/TRAINING PROGRAM

## 2019-12-01 PROCEDURE — 40000344 ZZHCL STATISTIC THAWING COMPONENT: Performed by: PEDIATRICS

## 2019-12-01 PROCEDURE — 25000132 ZZH RX MED GY IP 250 OP 250 PS 637: Performed by: STUDENT IN AN ORGANIZED HEALTH CARE EDUCATION/TRAINING PROGRAM

## 2019-12-01 PROCEDURE — 82248 BILIRUBIN DIRECT: CPT | Performed by: HOSPITALIST

## 2019-12-01 PROCEDURE — 87040 BLOOD CULTURE FOR BACTERIA: CPT | Performed by: STUDENT IN AN ORGANIZED HEALTH CARE EDUCATION/TRAINING PROGRAM

## 2019-12-01 PROCEDURE — 25000128 H RX IP 250 OP 636: Performed by: STUDENT IN AN ORGANIZED HEALTH CARE EDUCATION/TRAINING PROGRAM

## 2019-12-01 PROCEDURE — 36415 COLL VENOUS BLD VENIPUNCTURE: CPT | Performed by: STUDENT IN AN ORGANIZED HEALTH CARE EDUCATION/TRAINING PROGRAM

## 2019-12-01 PROCEDURE — 85384 FIBRINOGEN ACTIVITY: CPT | Performed by: STUDENT IN AN ORGANIZED HEALTH CARE EDUCATION/TRAINING PROGRAM

## 2019-12-01 PROCEDURE — 86901 BLOOD TYPING SEROLOGIC RH(D): CPT | Performed by: STUDENT IN AN ORGANIZED HEALTH CARE EDUCATION/TRAINING PROGRAM

## 2019-12-01 PROCEDURE — 25000128 H RX IP 250 OP 636: Performed by: PEDIATRICS

## 2019-12-01 PROCEDURE — 85384 FIBRINOGEN ACTIVITY: CPT | Performed by: HOSPITALIST

## 2019-12-01 PROCEDURE — P9012 CRYOPRECIPITATE EACH UNIT: HCPCS | Performed by: STUDENT IN AN ORGANIZED HEALTH CARE EDUCATION/TRAINING PROGRAM

## 2019-12-01 PROCEDURE — 99223 1ST HOSP IP/OBS HIGH 75: CPT | Mod: AI | Performed by: PEDIATRICS

## 2019-12-01 PROCEDURE — 86850 RBC ANTIBODY SCREEN: CPT | Performed by: STUDENT IN AN ORGANIZED HEALTH CARE EDUCATION/TRAINING PROGRAM

## 2019-12-01 PROCEDURE — C9113 INJ PANTOPRAZOLE SODIUM, VIA: HCPCS | Performed by: STUDENT IN AN ORGANIZED HEALTH CARE EDUCATION/TRAINING PROGRAM

## 2019-12-01 RX ORDER — HYDROMORPHONE HYDROCHLORIDE 2 MG/1
2 TABLET ORAL EVERY 4 HOURS PRN
Status: DISCONTINUED | OUTPATIENT
Start: 2019-12-01 | End: 2019-12-01

## 2019-12-01 RX ORDER — NALOXONE HYDROCHLORIDE 0.4 MG/ML
.1-.4 INJECTION, SOLUTION INTRAMUSCULAR; INTRAVENOUS; SUBCUTANEOUS
Status: DISCONTINUED | OUTPATIENT
Start: 2019-12-01 | End: 2019-12-02 | Stop reason: HOSPADM

## 2019-12-01 RX ORDER — LACTULOSE 10 G/15ML
30 SOLUTION ORAL 2 TIMES DAILY
Status: DISCONTINUED | OUTPATIENT
Start: 2019-12-01 | End: 2019-12-01

## 2019-12-01 RX ORDER — POLYETHYLENE GLYCOL 3350 17 G
2 POWDER IN PACKET (EA) ORAL
Status: DISCONTINUED | OUTPATIENT
Start: 2019-12-01 | End: 2019-12-02 | Stop reason: HOSPADM

## 2019-12-01 RX ORDER — LACTULOSE 10 G/15ML
30 SOLUTION ORAL 4 TIMES DAILY
Status: DISCONTINUED | OUTPATIENT
Start: 2019-12-01 | End: 2019-12-02 | Stop reason: HOSPADM

## 2019-12-01 RX ORDER — MULTIVITAMIN,THERAPEUTIC
1 TABLET ORAL DAILY
Status: DISCONTINUED | OUTPATIENT
Start: 2019-12-01 | End: 2019-12-02 | Stop reason: HOSPADM

## 2019-12-01 RX ORDER — GABAPENTIN 300 MG/1
600 CAPSULE ORAL AT BEDTIME
Status: DISCONTINUED | OUTPATIENT
Start: 2019-12-01 | End: 2019-12-02 | Stop reason: HOSPADM

## 2019-12-01 RX ORDER — SPIRONOLACTONE 50 MG/1
100 TABLET, FILM COATED ORAL DAILY
Status: DISCONTINUED | OUTPATIENT
Start: 2019-12-01 | End: 2019-12-02 | Stop reason: HOSPADM

## 2019-12-01 RX ORDER — FERROUS SULFATE 325(65) MG
325 TABLET ORAL EVERY OTHER DAY
Status: DISCONTINUED | OUTPATIENT
Start: 2019-12-01 | End: 2019-12-02 | Stop reason: HOSPADM

## 2019-12-01 RX ORDER — OXYCODONE HYDROCHLORIDE 5 MG/1
5 TABLET ORAL EVERY 6 HOURS PRN
Status: DISCONTINUED | OUTPATIENT
Start: 2019-12-01 | End: 2019-12-01

## 2019-12-01 RX ORDER — FUROSEMIDE 10 MG/ML
40 INJECTION INTRAMUSCULAR; INTRAVENOUS DAILY
Status: DISCONTINUED | OUTPATIENT
Start: 2019-12-01 | End: 2019-12-02

## 2019-12-01 RX ORDER — LIDOCAINE 40 MG/G
CREAM TOPICAL
Status: DISCONTINUED | OUTPATIENT
Start: 2019-12-01 | End: 2019-12-02 | Stop reason: HOSPADM

## 2019-12-01 RX ORDER — HYDROMORPHONE HYDROCHLORIDE 2 MG/1
2 TABLET ORAL EVERY 4 HOURS PRN
Status: DISCONTINUED | OUTPATIENT
Start: 2019-12-01 | End: 2019-12-02 | Stop reason: HOSPADM

## 2019-12-01 RX ORDER — ONDANSETRON 4 MG/1
4 TABLET, ORALLY DISINTEGRATING ORAL EVERY 6 HOURS PRN
Status: DISCONTINUED | OUTPATIENT
Start: 2019-12-01 | End: 2019-12-02 | Stop reason: HOSPADM

## 2019-12-01 RX ORDER — OXYCODONE HYDROCHLORIDE 5 MG/1
5 TABLET ORAL EVERY 6 HOURS PRN
Status: DISCONTINUED | OUTPATIENT
Start: 2019-12-01 | End: 2019-12-02 | Stop reason: HOSPADM

## 2019-12-01 RX ORDER — GABAPENTIN 300 MG/1
300 CAPSULE ORAL 2 TIMES DAILY
Status: DISCONTINUED | OUTPATIENT
Start: 2019-12-01 | End: 2019-12-02 | Stop reason: HOSPADM

## 2019-12-01 RX ADMIN — GABAPENTIN 300 MG: 300 CAPSULE ORAL at 13:52

## 2019-12-01 RX ADMIN — HYDROMORPHONE HYDROCHLORIDE 2 MG: 2 TABLET ORAL at 03:50

## 2019-12-01 RX ADMIN — HYDROMORPHONE HYDROCHLORIDE 2 MG: 2 TABLET ORAL at 13:56

## 2019-12-01 RX ADMIN — HYDROMORPHONE HYDROCHLORIDE 2 MG: 2 TABLET ORAL at 22:46

## 2019-12-01 RX ADMIN — LACTULOSE 30 ML: 20 SOLUTION ORAL at 15:42

## 2019-12-01 RX ADMIN — HYDROMORPHONE HYDROCHLORIDE 2 MG: 2 TABLET ORAL at 07:52

## 2019-12-01 RX ADMIN — LACTULOSE 30 ML: 20 SOLUTION ORAL at 07:52

## 2019-12-01 RX ADMIN — OXYCODONE HYDROCHLORIDE 5 MG: 5 TABLET ORAL at 20:33

## 2019-12-01 RX ADMIN — LACTULOSE 30 ML: 20 SOLUTION ORAL at 20:33

## 2019-12-01 RX ADMIN — GABAPENTIN 600 MG: 300 CAPSULE ORAL at 22:43

## 2019-12-01 RX ADMIN — GABAPENTIN 300 MG: 300 CAPSULE ORAL at 07:52

## 2019-12-01 RX ADMIN — FERROUS SULFATE TAB 325 MG (65 MG ELEMENTAL FE) 325 MG: 325 (65 FE) TAB at 07:52

## 2019-12-01 RX ADMIN — ONDANSETRON 4 MG: 4 TABLET, ORALLY DISINTEGRATING ORAL at 10:07

## 2019-12-01 RX ADMIN — HYDROMORPHONE HYDROCHLORIDE 2 MG: 2 TABLET ORAL at 17:59

## 2019-12-01 RX ADMIN — SPIRONOLACTONE 100 MG: 50 TABLET ORAL at 07:52

## 2019-12-01 RX ADMIN — RIFAXIMIN 550 MG: 550 TABLET ORAL at 20:33

## 2019-12-01 RX ADMIN — FUROSEMIDE 40 MG: 10 INJECTION, SOLUTION INTRAVENOUS at 07:52

## 2019-12-01 RX ADMIN — RIFAXIMIN 550 MG: 550 TABLET ORAL at 07:52

## 2019-12-01 RX ADMIN — THERA TABS 1 TABLET: TAB at 07:52

## 2019-12-01 RX ADMIN — ONDANSETRON 4 MG: 4 TABLET, ORALLY DISINTEGRATING ORAL at 20:39

## 2019-12-01 RX ADMIN — PANTOPRAZOLE SODIUM 40 MG: 40 INJECTION, POWDER, FOR SOLUTION INTRAVENOUS at 07:52

## 2019-12-01 RX ADMIN — OXYCODONE HYDROCHLORIDE 5 MG: 5 TABLET ORAL at 11:31

## 2019-12-01 RX ADMIN — LACTULOSE 30 ML: 20 SOLUTION ORAL at 13:52

## 2019-12-01 ASSESSMENT — ACTIVITIES OF DAILY LIVING (ADL)
ADLS_ACUITY_SCORE: 10
ADLS_ACUITY_SCORE: 10
RETIRED_EATING: 0-->INDEPENDENT
COGNITION: 0 - NO COGNITION ISSUES REPORTED
TRANSFERRING: 0-->INDEPENDENT
AMBULATION: 0-->INDEPENDENT
FALL_HISTORY_WITHIN_LAST_SIX_MONTHS: NO
DRESS: 0-->INDEPENDENT
SWALLOWING: 0-->SWALLOWS FOODS/LIQUIDS WITHOUT DIFFICULTY
ADLS_ACUITY_SCORE: 12
RETIRED_COMMUNICATION: 0-->UNDERSTANDS/COMMUNICATES WITHOUT DIFFICULTY
ADLS_ACUITY_SCORE: 10
ADLS_ACUITY_SCORE: 12
BATHING: 0-->INDEPENDENT
TOILETING: 0-->INDEPENDENT

## 2019-12-01 NOTE — H&P
[facility], [place]  History and Physical - [facility] Service        Date of Admission:  12/1/2019    Assessment & Plan   Jessie Sanchez is a 44 year old female admitted on 12/1/2019. She has a hx of polysubstance use (amphetamines, alcohol, tobacco), end stage liver disease secondary to alcohol and hepatitis C, and hyponatremia who is admitted as transfer from [place] for higher level of care of cirrhosis and coagulopathy.    #End Stage Liver Disease  #Decompensated Cirrhosis--Alcohol, Hepatitis C  Hemodynamically stable on admission and clear mentation. Will need clarification for patient and family about prognosis and transplant candidacy. MELD score is extremely high, necessitates careful management to avoid life threatening complications of ESLD.   -GI hepatology consult in am  -Ascites management: Continue PTA spironolactone 100mg and furosemide 40mg  -HE management: Continue PTA lactulose and rifaximin    #Coagulopathy  #Normocytic anemia--stable  #Thrombocytopenia--stable  On admission denies hx of large volume GI bleeding, although has noted trace blood in vomit and stools as well as epistaxis and gingival bleeding in setting of liver related coagulopathy. Hb on admission 10.1, stable from day prior. Plt 47 on admission in the absence of visible bleeding. Does have known hx of portal hypertensive gastropathy, esophagitis, and duodenitis (12/2018).   -Type and screen, fibrinogen, INR, consented for blood products  -->Cryo ordered for fibrinogen <61  -IV PPI BID    #Diffuse abdominal pain  Has had persistent abdominal pain with occasional non bloody emesis during admission in [place]. Has received 6d course of ceftriaxone for empiric coverage of SBP. Denies recent fever/chills. However continues to be at high risk for SBP given ascites on exam. Abdominal pain also likely related to mass effect of ascites, liver inflammation.   -Blood cultures on admission  -Consider  diagnostic para if coagulopathy improves    #Hyponatremia  Na on admission 132, similar to values prior to transfer from [facility], likely related to hypervolemic hyponatremia in setting of cirrhosis. Creatinine reassuring against renal failure.   -Monitor Na daily    #Tobacco use  -Nicotine lozenges prn       Diet: Combination Diet 2 gm NA Diet    Fluids: None  DVT Prophylaxis: Pneumatic Compression Devices  Irby Catheter: not present  Code Status: Full Code      Disposition Plan   Expected discharge: 2 - 3 days, recommended to prior living arrangement once hemoglobin stable.  Entered: Monae Ahn MD 12/01/2019, 2:54 AM       Ana Allred MD  [facility] [place]  Please see sticky note for cross cover information  ______________________________________________________________________    Chief Complaint   Abnormal labs, abdominal pain    History is obtained from the patient    History of Present Illness   Jessie Sanchez is a 44 year old female who is a transfer from [place] where she has been admitted from 11/23-12/1 for RUQ pain and abnormal labs--bilirubin 30, INR 3.5, Hb 7.9. Reportedly 3 days prior to initial presentation had large volume epistasis. Endorses that she has had some ongoing small volume epistaxis and gingival bleeding with dental hygiene although not in a few days. Denies melena or hematochezia although some notes during hospitalization report trace blood streaked stools. Has been sober for the last 8 months except for one relapse a few months ago (UDS + alcohol on 9/5/19). Denies active drug use.     During the hospitalization in [place] she was empirically treated for SBP with ceftriaxone (11/24-11/30), decided to hold off on diagnostic paracentesis given coagulopathy. Received Vit K 11/23. Received 2 U pRBC on 11/23. Ammonia 11/23 57.  Had clear mentation throughout hospitalization and was vitally stable. Per the hospitalist team she was  deemed to be likely not a transplant candidate given active hepatitis C and alcohol use. The patient was transferred to [facility] overnight via plane on 11/30 for higher level of care. The case was discussed with Dr. Martínez from hepatology who reinforced the patient for transfer for higher level care, would not need a liver biopsy.     She was recently admitted to [facility] from 11/10-19 for alcoholic hepatitis and hepatitis C. She was found to have UTI and treated with 5d course of ceftriaxone. Was initially placed on steroids for alcoholic hepatitis and then was discontinued for elevated Ophelia score.     Review of Systems    The 10 point Review of Systems is negative other than noted in the HPI or here.     Past Medical History    I have reviewed this patient's medical history and updated it with pertinent information if needed.   Past Medical History:   Diagnosis Date     Cirrhosis (H)      Polysubstance abuse (H)     Meth, alcohol        Past Surgical History   I have reviewed this patient's surgical history and updated it with pertinent information if needed.  Past Surgical History:   Procedure Laterality Date     SALPINGO-OOPHORECTOMY BILATERAL          Social History   I have reviewed this patient's social history and updated it with pertinent information if needed. Jessie [address]  reports that she has been smoking. She has been smoking about 0.00 packs per day. She does not have any smokeless tobacco history on file. She reports previous alcohol use. She reports current drug use.    Family History   I have reviewed this patient's family history and updated it with pertinent information if needed.   Family History   Problem Relation Age of Onset     Anxiety Disorder Mother        Prior to Admission Medications   Prior to Admission Medications   Prescriptions Last Dose Informant Patient Reported? Taking?   HYDROmorphone (DILAUDID) 2 MG tablet   No No   Sig: Take 1 tablet (2 mg) by mouth every 3 hours as needed for  moderate to severe pain   gabapentin (NEURONTIN) 300 MG capsule   No No   Sig: Take 1-2 capsules (300-600 mg) by mouth 3 times daily :300 mg by mouth in the morning, 300 mg by mouth in the afternoon, 600 mg by mouth in the evening   lactulose (CHRONULAC) 10 GM/15ML solution   No No   Sig: Take 30 mLs by mouth 2 times daily   multivitamin, therapeutic (THERA-VIT) TABS tablet   No No   Sig: Take 1 tablet by mouth daily   pantoprazole (PROTONIX) 40 MG EC tablet   No No   Sig: Take 1 tablet (40 mg) by mouth daily   rifaximin (XIFAXAN) 550 MG TABS tablet   No No   Sig: Take 1 tablet (550 mg) by mouth 2 times daily      Facility-Administered Medications: None     Allergies   Allergies   Allergen Reactions     Codeine Rash       Physical Exam   Vital Signs: Temp: 97.8  F (36.6  C) Temp src: Oral BP: 134/58 Pulse: 109   Resp: 18 SpO2: 99 % O2 Device: None (Room air)    Weight: 185 lbs 12.8 oz    GEN Woman lying in bed, alert, chronically ill appearing but no acute distress  HEENT Sclera icteric, PERRL, no rhinorrhea, oropharynx without any active bleeding visible, MMM  CV RRR, III/VI BIRGIT loudest at LUSB heard throughout stress.   RESP No increased work of breathing on room air, CTAB  ABD Soft, distended, diffusely mild tender to palpation, bulging flanks, flanks dull to percussion, +BS  EXT warm, dry and well perfused without peripheral edema. No severe muscle wasting on extremities.   SKIN Diffusely jaundiced. Scattered splinter hemorrhages on fingernails, no palmar erythema  NEURO Alert and oriented. Face symmetric. Moves all extremities equally. No tremor seen at rest.       Data   Data reviewed today: I reviewed all medications, new labs and imaging results over the last 24 hours.     Abdominal Ultrasound 11/23/19  IMPRESSION:  1.  Cirrhosis.  2.  Ascites.  3.  Bidirectional portal vein flow with perisplenic varices consistent with  pulmonary hypertension. If there is concern for portal venous thrombus,  consider  evaluation with contrast-enhanced MRI or CT.        MRI ABDOMEN WO CONTRAST MRCP 11/3/2019  IMPRESSION:  1.  No evidence for cholelithiasis or choledocholithiasis.  2.  Marked gallbladder wall thickening which can be seen in the setting of cirrhosis or hepatitis. CHF also a consideration please correlate clinically.  3.  Hepatic cirrhosis with very large splenorenal shunt.

## 2019-12-01 NOTE — CONSULTS
[facility]    Hepatology Consult    Requesting provider: Angie Andersen MD    Consult requested for alcoholic liver disease      HPI:  44 year old female    Cirrhosis  - dx?  - ETOH  - hx ascites, HE  - no hx variceal bleed  - last EGD?  - HCC screening- liver doppler U/S nov 2019    ETOH hepatitis  - dx Oct 2019  - hx ascites, HE  - steroid non-responder    HCV  - dx Nov 2019  - hx meth use  - GT-1a  - no prior bx (see above)  - no prior rx    Patient transferred from [place] for higher level of care after presenting 11/23/19 with abdominal pain.  Patient was treated for presumptive SBP with ceftriaxone only.  Diagnostic paracentesis was not performed due to abnormal labs.  Recent history of epistaxis prior to admission noted.  Prior admission to [facility] earlier this month with alcoholic hepatitis and recently acquired hepatitis C noted.  Patient was determined to not be a candidate for liver transplantation due to recent methamphetamine use.    Patient reports sharp, non-radiating RUQ pain since ~1 week ago.  Pain fluctuates in severity with no obvious precipitants or relieving factors.  No relationship of pain to eating or bowel movements.  No associated nausea, vomiting or diarrhea.    Patient remains jaundiced but denies itch.    Patient denies major abdominal distension or lower extremity edema.  In fact, ankles much improved compared to earlier this months.    Patient denies lethargy or confusion.  She reports being adherent to lactulose and rifaximin.    Patient denies melena, hematemesis or hematochezia.    No history of fevers, sweats or chills.    Patient denies any recent alcohol use but then admits alcohol use in September (per positive UTox).    She initially denies any recent illicit drug use but again admits recent use when reminded of positive UTox in early November for meth.    Patient continues to smoke.      Medical hx Surgical hx   Past Medical History:   Diagnosis Date      Cirrhosis (H)      Hepatitis C      Polysubstance abuse (H)     Meth, alcohol      Past Surgical History:   Procedure Laterality Date     APPENDECTOMY       SALPINGO-OOPHORECTOMY BILATERAL       TONSILLECTOMY            Medications  Current Facility-Administered Medications   Medication Dose Route Frequency     ferrous sulfate  325 mg Oral Every Other Day     furosemide  40 mg Intravenous Daily     gabapentin  300 mg Oral BID     gabapentin  600 mg Oral At Bedtime     lactulose  30 mL Oral 4x Daily     multivitamin, therapeutic  1 tablet Oral Daily     pantoprazole (PROTONIX) IV  40 mg Intravenous BID     rifaximin  550 mg Oral BID     sodium chloride (PF)  3 mL Intracatheter Q8H     spironolactone  100 mg Oral Daily       Allergies  Allergies   Allergen Reactions     Codeine Rash       Family hx Social hx   Family History   Problem Relation Age of Onset     Anxiety Disorder Mother      Liver Disease No family hx of      Colon Cancer No family hx of       Social History     Tobacco Use     Smoking status: Current Every Day Smoker     Packs/day: 0.00   Substance Use Topics     Alcohol use: Not Currently     Drug use: Yes     Comment: Jackelyn     Lives in [place] with .  6 children, all healthy, aged 17-27.  Not currently working.     Review of systems  A 10-point review of systems was negative.      Examination  /59   Pulse 86   Temp 97.4  F (36.3  C)   Resp 18   Wt 84.3 kg (185 lb 12.8 oz)   SpO2 96%   BMI 28.25 kg/m      Intake/Output Summary (Last 24 hours) at 12/1/2019 1334  Last data filed at 12/1/2019 0800  Gross per 24 hour   Intake 120 ml   Output --   Net 120 ml       Gen- well, NAD, A+Ox3, jaundiced  Eye- EOMI, scleral icterus  ENT- MMM  Lym- no palpable LAD  CVS- RRR  RS- CTA  Abd- overweight, striae, soft, mild tenderness RUQ- no rebound or guarding, palpable liver edge, no ascites or splenomegaly on palpation or percussion, BS+  Extr- no JAYLEN  Neuro- mild asterixis  Skin- tattoos,  telangiectasia, jaundiced,   Psych- normal mood    Laboratory  Lab Results   Component Value Date     12/01/2019    POTASSIUM 3.8 12/01/2019    CHLORIDE 104 12/01/2019    CO2 20 12/01/2019    BUN 7 12/01/2019    CR 0.73 12/01/2019       Lab Results   Component Value Date    BILITOTAL 27.4 12/01/2019    ALT 46 12/01/2019    AST 54 12/01/2019    ALKPHOS 128 12/01/2019       Lab Results   Component Value Date    ALBUMIN 2.8 12/01/2019    PROTTOTAL 5.9 12/01/2019        Lab Results   Component Value Date    WBC 8.9 12/01/2019    HGB 10.1 12/01/2019    MCV 88 12/01/2019    PLT 47 12/01/2019       Lab Results   Component Value Date    INR 4.42 12/01/2019 11/11/19  HCV GT-1a  HCV RNA 25568    11/1/19  HAV IgM neg  HBV SAg neg    Radiology  Liver doppler U/S 11/23/19 (OSH) reviewed- cirrhosis, ascites, patent vasculature    Assessment  44 year old female with history of polysubstance abuse including recent hepatitis C infection from methamphetamine use, decompensated alcoholic cirrhosis and alcoholic hepatitis complicated with ascites and hepatic encephalopathy transferred from OSH with abdominal pain.  MELD-Na= 37 (stable).  Last ETOH= Sep 2019.  Last Meth= Nov 2019.  Patient is not a candidate for liver transplantation at this time due to recent methamphetamine use and is therefore not a candidate for living donor liver transplantation.  Will obtain diagnostic paracentesis to rule out SBP otherwise would discharge patient home in the absence of any acute medical issues.   Hepatitis C can be addressed as an outpatient with local GI.    Recommendations  1.  Diagnostic paracentesis  2.  Check serum PETH- ordered  3.  Reduce PPI to Q24  4.  Continue furosemide  5.  Continue spironolactone  6.  Continue lactulose and rifaximin    Discussed with hospital medicine team    Gordon Gomez MD  Hepatology staff

## 2019-12-01 NOTE — LETTER
2019    INSURER: Payor: COMMERCIAL / Plan: [employer] / Product Type: PPO /   ATTN: Marisa at [employer]  Re: Medical Necessity for Accompaniment during Transport  Patient: Jessie Sanchez  Policy ID#:  [id number]  : 1975      To Whom it May Concern:    I am writing to formally request that Boubacar Grayson the spouse of Jessie Sanchez accompany her on the train home from the [facility].  She needs to be accompanied throughout transit for her own safety.      I have treated Jessie Sanchez since 2019 and I have determined that it is medically appropriate for  this patient to be accompanied at all times during transit for the reason(s) stated below:      Jessie has acute ETOH hepatitis with cirrhosis that is stable and no longer requiring hospitalization. However, she must take her medications as scheduled or risks significant encephalopathy.        Boubacar has accompanied her here and provides emotional support and assistance with remembering to take her medications.  His support is essential in ensuring that she take her medications due to her underlying hepatic encephalopathy.        Patient needs to return home and the only safe way to do so is with her  accompanying her.        If Boubacar does not accompany Jsesie she is likely to miss her lactulose doses and become more confused during transport.  This could be disastrous.      I firmly believe that this therapy is clinically appropriate and that Jessie Sanchez would benefit from improved quality of life if allowed transport home with her .  Please contact me at Dept: [phone] if you require additional information to ensure the prompt approval for coverage.    Please send your written decision to me at this address:  [address] [address]  [phone]  Dept: [phone]  E-mail: [email or web address]      Sincerely,      Angie Andersen MD

## 2019-12-01 NOTE — PROGRESS NOTES
Brief Social Work note:  SW called by dietician that this pt.'s  was requesting meal ticket. SW provided 2 meal tickets to Boubacar () for today. Will ask weekday SW to f/u with pt. And Boubacar re: long term needs. Pt. Said she has [facility] worker and will get name and # for  (may get [facility] assistance with meals and lodging while here?).    ALEX Tripathi, LGSW  5th floor weekend Social Work pager [phone]  On call Social Work pager [phone]

## 2019-12-01 NOTE — CONSULTS
Brief CAPS Consult Note    Was asked by primary team to evaluate for possible bedside paracentesis.  Performed POCUS.  Findings below:  RUQ:  No ascites  RLQ:  No ascites  LUQ:  No ascites  LLQ:  No ascites    No ascites located that were within safe parameters for bedside paracentesis.  Discussed these findings with patient, family, and primary team.    Hayde Butler MD  Internal Medicine Hospitalist  [phone]  12/1/2019

## 2019-12-01 NOTE — PLAN OF CARE
Admission/Transfer from: [place] [id number] RN skin assessment completed. Yes  Significant findings include: Bruises on arms otherwise skin is intact.  [facility] Nurse Consult Ordered? No  Was NST/NA able to join during assessment? No

## 2019-12-01 NOTE — PLAN OF CARE
Pt admitted from [place]. A&O. VSS on RA except for tachy. Up ind. PRN PO dilaudid given for abd pain. Voiding. 1 BM. No signs of bleeding.  2g NA diet. Tolerating orals. 5 units of cryo infusing through L PIV.  at bedside. Goes outside to smoke. GI consult. Will cont to monitor.

## 2019-12-01 NOTE — PROGRESS NOTES
"CLINICAL NUTRITION SERVICES - ASSESSMENT NOTE     Nutrition Prescription    Recommendations already ordered by Registered Dietitian (RD):  HS snack - Boost Plus + pudding or fruit ice    Future/Additional Recommendations:  Encourage small, frequent meals (including evening snack)  Encourage adequate protein sources with meals  Encourage low sodium diet adherence.   Continue MVI.      REASON FOR ASSESSMENT  Jessie Sanchez is a/an 44 year old female assessed by the dietitian for Provider Order - \"diet advice for in liver failure\"    NUTRITION HISTORY  Per chart review, patient was seen by RD for \"high protein/high calorie and low sodium diet restriction\" on 11/12/19. Patient has consumed Boost Plus supplements on previous admissions.      Per discussion with patient's SO, patient has had a good appetite lately.  She enjoys Boost Plus supplements.  Today he discusses that he was unaware she should be following a low sodium diet.      CURRENT NUTRITION ORDERS  Diet: 2 g Sodium    Intake/Tolerance: Patient ordered a large breakfast meal this morning: scrambled eggs, hashbrowns, mead, Icelandic toast, a banana, a Boost Plus and 2 sunshine (while still staying within sodium restriction).  Patient's SO may be eating patient's food as he discusses having financial constraint and is asking for help with food.      LABS  Na 132 (low)  T.Bili 27.4 (high)    MEDICATIONS  Lasix 40 mg daily  MVI daily  Aldactone 100 mg daily  Lactulose QID    ANTHROPOMETRICS  Height: 5'8\"  Most Recent Weight: 84.3 kg (185 lb 12.8 oz)    IBW: 63.6 kg  BMI: Overweight BMI 25-29.9  Weight History: Likely weight fluctuates d/t fluid status.  Current weight is down 3.4 kg (3.9%) from weight on 11/10/19.    Dosing Weight: 69 kg (adj wt based on IBW of 63.6 kg and actual wt of 84.3 kg)    ASSESSED NUTRITION NEEDS  Estimated Energy Needs: 1061-1603 kcals/day (25 - 30 kcals/kg)  Justification: Maintenance  Estimated Protein Needs:  grams protein/day (1 - " 1.5 grams of pro/kg)  Justification: Increased needs and Maintenance  Estimated Fluid Needs: 1 mL/kcal or per MD  Justification: Maintenance    PHYSICAL FINDINGS  See malnutrition section below.  Unable to complete full physical assessment d/t patient being mostly asleep during visit.      MALNUTRITION  % Intake: No decreased intake noted  % Weight Loss: Up to 1-2% in 1 week (non-severe)  Subcutaneous Fat Loss: Unable to assess - no suyapa visual signs of wasting  Muscle Loss: Unable to assess - no suyapa visual signs of wasting  Fluid Accumulation/Edema: Unable to assess  Malnutrition Diagnosis: Unable to determine due to incomplete information    NUTRITION DIAGNOSIS  Food- and nutrition-related knowledge deficit related to low sodium diet as evidenced by patient's  with request for information.     INTERVENTIONS  Implementation  Nutrition Education: Provided education on small, frequent meals, protein sources, low sodium diet/restriction and relationship/importance in liver disease.  Handouts on sodium restriction provided.    Collaboration with other providers - Called SW re: patient's SO financial issues, request for food vouchers.   Medical food supplement therapy - Boost Plus  Modify composition of meals/snacks - evening snack    Goals  Patient to follow a lo  Patient to consume % of nutritionally adequate meal trays TID, or the equivalent with supplements/snacks.     Monitoring/Evaluation  Progress toward goals will be monitored and evaluated per protocol.    Asuncion gAuila MS, RD, LD  Pager [phone]

## 2019-12-01 NOTE — PLAN OF CARE
Shift 8925-8125:  AOx4, VSS on RA ex intermittent tachy. Fibrinogen 84, Unit of cryoprecipitate given, recheck pending. Nausea x1, Zofran resolved. 2 gram sodium diet, tolerating well with good appetite and adequate intake. Skin jaundice, old scars and bruises but intact. Plan to have paracentesis once fibrinogen is stable. L PIV WDL and SL. Will continue to monitor and follow POC.

## 2019-12-02 VITALS
DIASTOLIC BLOOD PRESSURE: 66 MMHG | RESPIRATION RATE: 16 BRPM | HEART RATE: 96 BPM | BODY MASS INDEX: 28.25 KG/M2 | WEIGHT: 185.8 LBS | SYSTOLIC BLOOD PRESSURE: 117 MMHG | OXYGEN SATURATION: 98 % | TEMPERATURE: 97.3 F

## 2019-12-02 LAB
ALBUMIN SERPL-MCNC: 2.5 G/DL (ref 3.4–5)
ALP SERPL-CCNC: 140 U/L (ref 40–150)
ALT SERPL W P-5'-P-CCNC: 43 U/L (ref 0–50)
ANION GAP SERPL CALCULATED.3IONS-SCNC: 6 MMOL/L (ref 3–14)
AST SERPL W P-5'-P-CCNC: 51 U/L (ref 0–45)
BILIRUB SERPL-MCNC: 24.8 MG/DL (ref 0.2–1.3)
BUN SERPL-MCNC: 6 MG/DL (ref 7–30)
CALCIUM SERPL-MCNC: 8.8 MG/DL (ref 8.5–10.1)
CHLORIDE SERPL-SCNC: 106 MMOL/L (ref 94–109)
CO2 SERPL-SCNC: 21 MMOL/L (ref 20–32)
CREAT SERPL-MCNC: 0.77 MG/DL (ref 0.52–1.04)
ERYTHROCYTE [DISTWIDTH] IN BLOOD BY AUTOMATED COUNT: 27.9 % (ref 10–15)
FIBRINOGEN PPP-MCNC: 105 MG/DL (ref 200–420)
GFR SERPL CREATININE-BSD FRML MDRD: >90 ML/MIN/{1.73_M2}
GLUCOSE SERPL-MCNC: 120 MG/DL (ref 70–99)
HCT VFR BLD AUTO: 30.4 % (ref 35–47)
HGB BLD-MCNC: 9.7 G/DL (ref 11.7–15.7)
INR PPP: 3.73 (ref 0.86–1.14)
MCH RBC QN AUTO: 28.3 PG (ref 26.5–33)
MCHC RBC AUTO-ENTMCNC: 31.9 G/DL (ref 31.5–36.5)
MCV RBC AUTO: 89 FL (ref 78–100)
PLATELET # BLD AUTO: 40 10E9/L (ref 150–450)
POTASSIUM SERPL-SCNC: 3.4 MMOL/L (ref 3.4–5.3)
PROT SERPL-MCNC: 5.6 G/DL (ref 6.8–8.8)
RBC # BLD AUTO: 3.43 10E12/L (ref 3.8–5.2)
SODIUM SERPL-SCNC: 133 MMOL/L (ref 133–144)
WBC # BLD AUTO: 6.8 10E9/L (ref 4–11)

## 2019-12-02 PROCEDURE — 80053 COMPREHEN METABOLIC PANEL: CPT | Performed by: STUDENT IN AN ORGANIZED HEALTH CARE EDUCATION/TRAINING PROGRAM

## 2019-12-02 PROCEDURE — 36415 COLL VENOUS BLD VENIPUNCTURE: CPT | Performed by: STUDENT IN AN ORGANIZED HEALTH CARE EDUCATION/TRAINING PROGRAM

## 2019-12-02 PROCEDURE — 25000128 H RX IP 250 OP 636: Performed by: PEDIATRICS

## 2019-12-02 PROCEDURE — C9113 INJ PANTOPRAZOLE SODIUM, VIA: HCPCS | Performed by: PEDIATRICS

## 2019-12-02 PROCEDURE — 25000125 ZZHC RX 250: Performed by: PEDIATRICS

## 2019-12-02 PROCEDURE — 85610 PROTHROMBIN TIME: CPT | Performed by: STUDENT IN AN ORGANIZED HEALTH CARE EDUCATION/TRAINING PROGRAM

## 2019-12-02 PROCEDURE — 25000128 H RX IP 250 OP 636: Performed by: STUDENT IN AN ORGANIZED HEALTH CARE EDUCATION/TRAINING PROGRAM

## 2019-12-02 PROCEDURE — 99239 HOSP IP/OBS DSCHRG MGMT >30: CPT | Performed by: PEDIATRICS

## 2019-12-02 PROCEDURE — 85027 COMPLETE CBC AUTOMATED: CPT | Performed by: STUDENT IN AN ORGANIZED HEALTH CARE EDUCATION/TRAINING PROGRAM

## 2019-12-02 PROCEDURE — 85384 FIBRINOGEN ACTIVITY: CPT | Performed by: STUDENT IN AN ORGANIZED HEALTH CARE EDUCATION/TRAINING PROGRAM

## 2019-12-02 PROCEDURE — 25000132 ZZH RX MED GY IP 250 OP 250 PS 637: Performed by: PEDIATRICS

## 2019-12-02 PROCEDURE — 80321 ALCOHOLS BIOMARKERS 1OR 2: CPT | Performed by: INTERNAL MEDICINE

## 2019-12-02 PROCEDURE — 25000132 ZZH RX MED GY IP 250 OP 250 PS 637: Performed by: STUDENT IN AN ORGANIZED HEALTH CARE EDUCATION/TRAINING PROGRAM

## 2019-12-02 RX ORDER — SPIRONOLACTONE 100 MG/1
100 TABLET, FILM COATED ORAL DAILY
COMMUNITY

## 2019-12-02 RX ORDER — FUROSEMIDE 40 MG
40 TABLET ORAL DAILY
COMMUNITY

## 2019-12-02 RX ORDER — SPIRONOLACTONE 50 MG/1
100 TABLET, FILM COATED ORAL DAILY
Status: DISCONTINUED | OUTPATIENT
Start: 2019-12-02 | End: 2019-12-02

## 2019-12-02 RX ORDER — POLYETHYLENE GLYCOL 3350 17 G
2 POWDER IN PACKET (EA) ORAL
Qty: 60 LOZENGE | Refills: 0 | Status: SHIPPED | OUTPATIENT
Start: 2019-12-02

## 2019-12-02 RX ORDER — PANTOPRAZOLE SODIUM 40 MG/1
40 TABLET, DELAYED RELEASE ORAL DAILY
Status: DISCONTINUED | OUTPATIENT
Start: 2019-12-03 | End: 2019-12-02 | Stop reason: HOSPADM

## 2019-12-02 RX ORDER — FUROSEMIDE 40 MG
40 TABLET ORAL DAILY
Status: DISCONTINUED | OUTPATIENT
Start: 2019-12-03 | End: 2019-12-02 | Stop reason: HOSPADM

## 2019-12-02 RX ORDER — ONDANSETRON 4 MG/1
4 TABLET, ORALLY DISINTEGRATING ORAL EVERY 6 HOURS PRN
Qty: 30 TABLET | Refills: 0 | Status: SHIPPED | OUTPATIENT
Start: 2019-12-02

## 2019-12-02 RX ORDER — SPIRONOLACTONE 100 MG/1
100 TABLET, FILM COATED ORAL DAILY
Start: 2019-12-03 | End: 2019-12-02

## 2019-12-02 RX ORDER — OXYCODONE HYDROCHLORIDE 5 MG/1
5-10 TABLET ORAL EVERY 4 HOURS PRN
Qty: 40 TABLET | Refills: 0 | Status: SHIPPED | OUTPATIENT
Start: 2019-12-02

## 2019-12-02 RX ADMIN — PANTOPRAZOLE SODIUM 40 MG: 40 INJECTION, POWDER, FOR SOLUTION INTRAVENOUS at 08:44

## 2019-12-02 RX ADMIN — LIDOCAINE HYDROCHLORIDE 30 ML: 20 SOLUTION ORAL; TOPICAL at 11:13

## 2019-12-02 RX ADMIN — OXYCODONE HYDROCHLORIDE 5 MG: 5 TABLET ORAL at 08:51

## 2019-12-02 RX ADMIN — HYDROMORPHONE HYDROCHLORIDE 2 MG: 2 TABLET ORAL at 12:44

## 2019-12-02 RX ADMIN — OXYCODONE HYDROCHLORIDE 5 MG: 5 TABLET ORAL at 14:49

## 2019-12-02 RX ADMIN — GABAPENTIN 300 MG: 300 CAPSULE ORAL at 14:45

## 2019-12-02 RX ADMIN — SPIRONOLACTONE 100 MG: 50 TABLET ORAL at 08:45

## 2019-12-02 RX ADMIN — LACTULOSE 30 ML: 20 SOLUTION ORAL at 12:42

## 2019-12-02 RX ADMIN — THERA TABS 1 TABLET: TAB at 08:45

## 2019-12-02 RX ADMIN — RIFAXIMIN 550 MG: 550 TABLET ORAL at 08:45

## 2019-12-02 RX ADMIN — FUROSEMIDE 40 MG: 10 INJECTION, SOLUTION INTRAVENOUS at 08:44

## 2019-12-02 RX ADMIN — LACTULOSE 30 ML: 20 SOLUTION ORAL at 08:45

## 2019-12-02 RX ADMIN — ONDANSETRON 4 MG: 4 TABLET, ORALLY DISINTEGRATING ORAL at 04:38

## 2019-12-02 RX ADMIN — OXYCODONE HYDROCHLORIDE 5 MG: 5 TABLET ORAL at 02:32

## 2019-12-02 RX ADMIN — LACTULOSE 30 ML: 20 SOLUTION ORAL at 17:07

## 2019-12-02 RX ADMIN — ONDANSETRON 4 MG: 4 TABLET, ORALLY DISINTEGRATING ORAL at 14:52

## 2019-12-02 RX ADMIN — GABAPENTIN 300 MG: 300 CAPSULE ORAL at 08:45

## 2019-12-02 ASSESSMENT — ACTIVITIES OF DAILY LIVING (ADL)
ADLS_ACUITY_SCORE: 10

## 2019-12-02 NOTE — PROGRESS NOTES
SPIRITUAL HEALTH SERVICES  SPIRITUAL ASSESSMENT Progress Note  [facility] ([place]) 5A  On-Call    PRIMARY FOCUS: Follow-up on hospital  request    Brief interaction with Jessie and her  (Boubacar). Jessie indicated that she was resting and was not up for conversation at the time. Her  (Boubacar) wondered about additional or possible financial resources given that they are from out of town. I invited them to reconnect with social work regarding possible resources available.     PLAN: I have no plan for follow-up today. Please consult unit  for ongoing  support.    Mathew Clark, MPH, M.Div., [facility]  Staff   Pager [phone]  [facility] remains available 24/7 for emergent requests/referrals, either by having the switchboard page the on-call  or by entering an ASAP/STAT consult in Epic (this will also page the on-call ).

## 2019-12-02 NOTE — PROGRESS NOTES
"Brief Care Coordination Note:    Per discussion with the primary team, patient is medically ready for discharge today. Patient was transferred from OSH via ambulance. Writer discussed discharge planning with the patient and inquired about transportation. Patient and spouse stated that they have \"no options\" for getting home, repeatedly stated, \"no way\" when told that that accommodations department may not be able to provide assistance with transportation at discharge. Writer suggested calling friends and family for assistance. Patient and spouse upset, stated, \"You're going to discharge us to the street?\" Writer reiterated that patient and her spouse should contact family/friends, writer will follow up with the accommodations department and family.     6208 Addendum:   Patient and spouse state that they do not have resources for transportation at discharge. Patient's spouse stated, \"If they're going to discharge her, you better start working on this.\"  Writer contacted patient's RN Medical Case Manager [employer] (phone: [phone], fax: [phone]) to discuss discharge planning and possible resources. Ingris suggested contacting [facility] ([phone]), VM left at this time. Ingris requested a quick update regarding discharge plan, voiced concerns over discharge plan, noting that patient seems to be near end stage liver and that her spouse seems to be \"driving the ship\" and not considering hospice care at this time. Ingris noted that there was concern that the patient and her spouse previously sold narcotics after discharge from the hospital. RNCC has updated the Accommodations department, will continue to work on discharge planning.     7077 Addendum:  Provider paged x2 to discuss discharge planning. Patient has ride benefits through her insurance and will likely have a train ride covered to home this evening. Letter for medical necessity needed ASAP to allow her " spouse to travel with her. Coordination of purchasing the train tickets must be completed before 4 pm. Patient will need a ride from the hospital to the train station and a ride home from the train station in [place]. Letter of necessity obtained, faxed to Marisa mcgrath/[employer] (fax: [phone]). Patient and her spouse are agreeable to discharge plan.     1615 Addendum:  Confirmation of train tickets received, printed and given to patient. A taxi ride has been scheduled for 8pm today through [employer]. Patient will need to be down at the front door for taxi ride. Patient does not have a working phone with her, the 5A nurses station number was give to taxi company. When patient and her spouse arrive in [place], they will need to call for a taxi that has been arranged through their insurance. This ride has been scheduled through her [facility] plan.     Per their instructions:   They HAVE to call the [employer] company at [phone], [place] [employer] once they arrive in [place]. Tell them a ride was set up for them with [employer] and it s under Jessie s name.    Writer contacted [employer] regarding discharge plan, voicemail left at this time. Will fax discharge orders.      Deandra Palumbo, VANNA, BSN    [facility]    Medicine Group  [address] [place] [id number]    [email or web address]    Office: [phone] Pager: [phone]  To contact weekend RNCC, dial [phone] and enter pager number [phone] at prompt. This pager can not be contacted by text page or outside line.

## 2019-12-02 NOTE — PLAN OF CARE
/66 (BP Location: Left arm)   Pulse 96   Temp 97.3  F (36.3  C) (Oral)   Resp 16   Wt 84.3 kg (185 lb 12.8 oz)   SpO2 98%   BMI 28.25 kg/m      Time: 3998-7866     Reason for admission: GIB  Vitals: VSS on RA  Activity: Independent, ambulating outside to smoke  Pain: c/o abdominal pain, PRN Dilaudid and PRN Oxy with management in s/s  Neuro: A&Ox4, calls appropriately  Cardiac: WDL  Respiratory: WDL, LS CTAB  GI/: Voids spontaneously, BM WDL  Diet: Low Sodium, good appetite  Lines: PIV removed  Skin/Wounds: Jaundice  Labs: Fibrinogen=105. Platelets=40. INR=3.73. Bili=24.8    New changes this shift: GI cocktail started for epigastric pain. Pt medically ready to discharge. AVS signed by MD, reviewed by pt with bedside RN. PIV removed. Discharge medications picked up and given to pt for discharge.      Plan: Transport arranged at 1945 to take pt and spouse to curb to Taxi. Take taxi around 2000 to [employer] to take train back to [facility] and then taxi back to home. Train ticket given to pt and spouse, copies in chart.      Continue to monitor and follow POC

## 2019-12-02 NOTE — PLAN OF CARE
Pt A&O. VSS on RA. Up ind. PRN dilaudid and oxy given for abd pain. PRN Zofran given for nausea. 2 g NA diet. L PIV-SL. Voiding. 1 BM per pt.  Goes outside to smoke.  at bedside. Will con to monitor.

## 2019-12-02 NOTE — PHARMACY-ADMISSION MEDICATION HISTORY
Admission medication history interview status for the 12/1/2019 admission is complete. See Epic admission navigator for allergy information, pharmacy, prior to admission medications and immunization status.     Medication history interview sources:  the patient, fill records, chart review    Changes made to PTA medication list (reason)  Added: spironolactone, furosemide  Deleted: NA  Changed: NA    Additional medication history information (including reliability of information, actions taken by pharmacist):  Patient states that she has not been taking rifaximin at home    Prior to Admission medications    Medication Sig Last Dose Taking? Auth Provider   furosemide (LASIX) 40 MG tablet Take 40 mg by mouth daily  Yes Unknown, Entered By History   gabapentin (NEURONTIN) 300 MG capsule Take 1-2 capsules (300-600 mg) by mouth 3 times daily :300 mg by mouth in the morning, 300 mg by mouth in the afternoon, 600 mg by mouth in the evening  Yes Jasmin Cat MD   lactulose (CHRONULAC) 10 GM/15ML solution Take 30 mLs by mouth 2 times daily  Yes Jasmin Cat MD   multivitamin, therapeutic (THERA-VIT) TABS tablet Take 1 tablet by mouth daily  Yes Jasmin Cat MD   nicotine (COMMIT) 2 MG lozenge Place 1 lozenge (2 mg) inside cheek every hour as needed for smoking cessation  Yes Angie Andersen MD   ondansetron (ZOFRAN-ODT) 4 MG ODT tab Take 1 tablet (4 mg) by mouth every 6 hours as needed for nausea or vomiting  Yes Angie Andersen MD   oxyCODONE (ROXICODONE) 5 MG tablet Take 1-2 tablets (5-10 mg) by mouth every 4 hours as needed  Yes Angie Andersen MD   pantoprazole (PROTONIX) 40 MG EC tablet Take 1 tablet (40 mg) by mouth daily  Yes Jasmin Cat MD   spironolactone (ALDACTONE) 100 MG tablet Take 1 tablet (100 mg) by mouth daily  Yes Angie Andersen MD   rifaximin (XIFAXAN) 550 MG TABS tablet Take 1 tablet (550 mg) by mouth 2 times daily NOT TAKING  Jasmin Cat MD     Medication  history completed by: Janett Kaiser: PharmD student class of 2020

## 2019-12-02 NOTE — PLAN OF CARE
5677-2569. A&Ox4. Fibrinogen recheck 111. Paracentesis ordered. No ascites noted on ultrasound at bedside- will hold off on paracentesis. PO Dilaudid PRN for abdominal pain. Pt up independent.  at bedside. Calls appropriately.

## 2019-12-03 ENCOUNTER — PATIENT OUTREACH (OUTPATIENT)
Dept: CARE COORDINATION | Facility: CLINIC | Age: 44
End: 2019-12-03

## 2019-12-03 NOTE — PROGRESS NOTES
Social Work Services Progress Note    Hospital Day: 2  Collaborated with:  Bedside RN, RNCC, pt, pt's spouse    Data:  Pt is a 44-year-old female transferred from [facility] to [facility] 12/1/19.     Intervention:  Updated by bedside RN that pt's spouse is requesting a meal voucher for a meal tonight prior to pt's discharge. Provided pt's spouse with 1 meal voucher.    Assessment:  Provided meal voucher for pt's spouse    Plan:    Anticipated Disposition:  Home, no needs identified    Barriers to d/c plan:  None identified    Follow Up:  SW to follow and assist as needed    ALEX Sierra, LGSW    [facility]  Pager [phone]  Phone [phone]

## 2019-12-03 NOTE — PROGRESS NOTES
Number listed is for a treatment center in [place]    Patient is staying there currently    No further post discharge calls were made

## 2019-12-04 NOTE — DISCHARGE SUMMARY
[facility], [place]  Hospitalist Discharge Summary       Date of Admission:  12/1/2019  Date of Discharge:  12/2/2019  8:10 PM  Discharging Provider: Angie Andersen MD  Discharge Team: Hospitalist Service, Gold 4    Discharge Diagnoses   Alcoholic hepatitis  End Stage Liver Disease/ Cirrhosis MELD 34 this includes a >50% mortality in the next 3 months  Not a liver transplant candidate. Can be re-evaluated if remains sober and off methamphetamines for 6 months, last meth use Nov 2019, last EtOH Sept 2019  History of GI bleeding, no GI bleeding evident on this hospitalization    Follow-ups Needed After Discharge   Follow-up Appointments     Follow Up and recommended labs and tests      Follow up with primary care provider, JUNI CLARK, within 7 days for   hospital follow- up.  F/u with PCP within 7 days with CBC, CMP, INR   Establish with hepatology close to home to start treatment for hepatitis C  Establish with chemical dependency outpatient center close to home             Unresulted Labs Ordered in the Past 30 Days of this Admission     Date and Time Order Name Status Description    12/2/2019 0001 Phosphatidylethanol (PEth) In process     12/1/2019 0402 Blood culture Preliminary     12/1/2019 0402 Blood culture Preliminary     12/1/2019 0333 Bilirubin direct In process     11/11/2019 1237 Hepatitis C High Resolution Genotype In process       These results will be followed up by Dr. Gomez in GI and the hepatitis C high resolution genotype should be requested by the hepatology nurse practitioner in [place]    Discharge Disposition   Discharged to home - patient is currently living in a treatment facility   Condition at discharge: Stable    Reason for Admission:  Jessie presented to an outside hospital with RUQ abdominal pain, rising bilirubin and a recent large volume epistaxis.  She was transferred here even though she was known to not be a liver transplant candidate to  evaluate if there was anything further that could be done to improve her liver function.  In summary the primary thing to improve her liver function is alcohol and methamphetamine cessation. However, it is likely to late to reverse the damage done.      Hospital Course      #End Stage Liver Disease  #Decompensated Cirrhosis--Alcohol, Hepatitis C  Hemodynamically stable on admission and clear mentation. MELD score is extremely high. Her risk of death in the next 3 months is >50%.  Even with optimal management.  We continued her home regimen of spironolactone 100mg, furosemide 40mg, lactulose and rifaximin. She remained hemodynamically and mentally stable.  She did not even have enough ascites to tap. Therefore a paracentesis was not performed.    - she is not a liver transplant candidate  - to establish care with a hepatology team near home for hepatitis C treatment - hepatitis C genotype done and result pending  - discussed need to stop both alcohol and methamphatamine. Patient had stopped alcohol, but not meth  - PEth pending looking for recent alcohol use     #Coagulopathy  #Normocytic anemia--stable  #Thrombocytopenia--stable  On admission denies hx of large volume GI bleeding known hx of portal hypertensive gastropathy, esophagitis, and duodenitis (12/2018).   -Type and screen, fibrinogen, INR. She did receive cryo to get her fibrinogen up to >100 in anticipation of a paracentesis, but ultimately this was not performed as she had no ascites.      #Diffuse abdominal pain  Has had persistent abdominal pain with occasional non bloody emesis during admission in [place]. Has received 6d course of ceftriaxone for empiric coverage of SBP. Denies recent fever/chills. Blood cultures were on admission with no growth by discharge.      #Hyponatremia  Na on admission 132 - stable to previous.    #Tobacco use  -Nicotine lozenges prn  Diet:  2 gm NA Diet      Consultations This Hospital Stay   GI HEPATOLOGY ADULT IP  CONSULT  NUTRITION SERVICES ADULT IP CONSULT  PALLIATIVE CARE ADULT IP CONSULT  INTERNAL MEDICINE PROCEDURE TEAM ADULT IP CONSULT [facility] - PARACENTESIS  MEDICATION HISTORY IP PHARMACY CONSULT    Code Status   Prior    Time Spent on this Encounter   I, Angie Andersen MD, personally saw the patient today and spent greater than 30 minutes discharging this patient.       Angie Andersen MD  [facility], [place]  ______________________________________________________________________    Physical Exam   Vital Signs:                    Weight: 185 lbs 12.8 oz  Constitutional: awake, alert, cooperative, no apparent distress, and appears stated age  Eyes: extra-ocular muscles intact, icteric bilaterally and conjunctiva normal  Respiratory: No increased work of breathing, good air exchange, clear to auscultation bilaterally, no crackles or wheezing  Cardiovascular: Normal apical impulse, regular rate and rhythm, normal S1 and S2, no S3 or S4, and no murmur noted  GI: No scars, normal bowel sounds, soft, distended, non-tender, no masses palpated, no hepatosplenomegally  Skin: jaundice noted  Musculoskeletal: There is no redness, warmth, or swelling of the joints.  Full range of motion noted.  Motor strength is 5 out of 5 all extremities bilaterally.  Tone is normal.  Neurologic: Awake, alert, oriented to name, place and time. No asterixis  Neuropsychiatric: patient is appropriately sad about her diagnosis and has varying levels of understanding of her diagnosis.       Primary Care Physician   JUNI CLARK    Discharge Orders      Home care nursing referral      Reason for your hospital stay    Jessie was transferred to the [facility] from [facility] to see a transplant hepatologist.  She was evaluated by several transplant hepatologists and her medications and other treatment are currently optimized.  There is no further care that transplant hepatology can provide  until around March when she will be free of alcohol for at least 6 months.      In the mean time she can follow up with a GI nurse practitioner near home (as arranged prior admission) for possible hepatitis C treatment.      To follow up with her Primary Care doctor for pain control and assistance with disability forms, request for PCA and therapy dog.     Follow Up and recommended labs and tests    Follow up with primary care provider, JUNI CLARK, within 7 days for hospital follow- up.  F/u with PCP within 7 days with CBC, CMP, INR   Establish with hepatology close to home to start treatment for hepatitis C  Establish with chemical dependency outpatient center close to home     Activity    Your activity upon discharge: activity as tolerated     Diet    Follow this diet upon discharge: Orders Placed This Encounter      Snacks/Supplements Adult: Other; HS snack time - send Boost Plus (vary flavors) with orange fruit ice OR chocolate pudding (vary if able); Between Meals      Combination Diet 2 gm NA Diet       Significant Results and Procedures   Most Recent 3 CBC's:  Recent Labs   Lab Test 12/02/19 0645 12/01/19 0425 11/18/19  0553   WBC 6.8 8.9 7.9   HGB 9.7* 10.1* 7.2*   MCV 89 88 84   PLT 40* 47* 45*     Most Recent 3 BMP's:  Recent Labs   Lab Test 12/02/19 0645 12/01/19 0425 11/18/19  0553    132* 129*   POTASSIUM 3.4 3.8 3.6   CHLORIDE 106 104 99   CO2 21 20 26   BUN 6* 7 34*   CR 0.77 0.73 0.83   ANIONGAP 6 7 4   KEESHA 8.8 8.6 8.6   * 111* 95     Most Recent 2 LFT's:  Recent Labs   Lab Test 12/02/19 0645 12/01/19  0425   AST 51* 54*   ALT 43 46   ALKPHOS 140 128   BILITOTAL 24.8* 27.4*     Most Recent 3 INR's:  Recent Labs   Lab Test 12/02/19 0645 12/01/19 0425 11/18/19  0553   INR 3.73* 4.42* 4.30*   ,   Results for orders placed or performed during the hospital encounter of 11/10/19   US Abdomen Limited w Doppler Complete    Narrative    EXAMINATION: US ABDOMEN LIMITED WITH DOPPLER  COMPLETE 11/11/2019 8:39  AM     COMPARISON: No images available. Report from abdominal ultrasound from  11/1/2019 in Care Everywhere.    HISTORY: 44-year-old female with decompensated cirrhosis. Evaluate for  portal vein thrombus.    TECHNIQUE: The abdomen was scanned in standard fashion with  specialized ultrasound transducer(s) using both gray-scale, color  Doppler, and spectral flow techniques.    Findings:  Liver: The liver measures 11.0 cm. The liver demonstrates a coarsened  echotexture with mild surface nodularity. No evidence of a focal  hepatic mass. The main portal vein is patent and measures 0.6 cm.    Extrahepatic portal vein flow is retrograde at 29 cm/s.  Right portal vein flow is retrograde, measuring 30 cm/s.  Left portal vein flow is retrograde, measuring 72 cm/s.    Flow in the hepatic artery is towards the liver and:  159 cm/s peak systolic  0.69 resistive index.     The splenic vein is not well visualized. The left, middle, and right  hepatic veins are patent with flow towards the IVC. The IVC is patent  with flow towards the heart.    Gallbladder: Gallbladder appears dilated. There is no wall thickening,  pericholecystic fluid, positive sonographic Arenas's sign, or evidence  for cholelithiasis.    Bile Ducts: No intrahepatic biliary dilation. The common bile duct is  mildly dilated, measuring 7 mm (similar to ultrasound from 11/1/2019  where the common bile duct measured 7 mm). No evidence of  choledocholithiasis.    Pancreas: Not well visualized on this exam.    Kidneys: The right kidney measures 13.0 cm. There is no  hydronephrosis, shadowing renal stones, or focal renal mass.    Fluid: Mild ascites.      Impression    Impression:   1.  No sonographic evidence of portal venous thrombus as questioned.  2.  Cirrhotic appearing liver with sequelae of portal hypertension  with mild ascites and reversal of flow in all of the portal veins.  3.  Pancreas and splenic vein are not well-seen on this  exam.    I have personally reviewed the examination and initial interpretation  and I agree with the findings.    PORTER MAIN MD   XR Abdomen Port 1 View    Narrative    Exam: XR ABDOMEN PORT 1 VW, 11/13/2019 10:28 AM    Indication: Nausea/vomiting    Comparison: None    Findings:   Nonobstructive bowel gas pattern. No free air or pneumatosis. There  are small punctate calcifications throughout the right side of the  abdomen. Although the images are not available on outside report from  a CT of 9/6/2019 does not cardiac about calcifications. This likely is  in the patient's colon related to medication.      Impression    Impression: Nonobstructive bowel gas pattern. Calcifications in the  right abdomen of unknown significance. There is an outside report that  does not talk about calcifications. Likely of no clinical  significance. Direct comparison with those images might be helpful.    ELISSA GARZA MD   POC US Guide for Paracentesis    Impression    Brief CAPS Consult Note    I was contacted to evaluate Jessie Grayson for possible bedside diagnostic paracentesis.  A bedside ultrasound was performed and noted:    RUQ: Trace ascites  RLQ: Trace ascites  LUQ: Trace ascites  LLQ: Trace ascites    Additional images obtained:  RUQ posterior to ribs: ascites located superior to liver but posterior to ribs    Given only trace ascites or pockets posterior to ribs, no safe location for bedside diagnostic paracentesis was found.  If high clinical suspicion or ongoing need for sample, may review with IR if they would attempt to sample the suprahepatic fluid collection.    Images saved to the chart.  Please contact us with questions or concerns.    Diana Bardales MD  CAPS Team  11/14/2019    XR Chest 2 Views    Narrative    XR CHEST 2 VW  11/15/2019 1:41 PM      HISTORY: Please look pleural effusion, PNA    COMPARISON: None    FINDINGS: Frontal and lateral views of the chest. The cardiac  silhouette size is within normal  limits. There is no significant  pleural effusion or pneumothorax. There are no acute airspace  opacities. The visualized upper abdomen is unremarkable. No acute  osseous abnormality.       Impression    IMPRESSION: No acute cardiopulmonary abnormality.     I have personally reviewed the examination and initial interpretation  and I agree with the findings.    GWENDOLYN SUERO MD   XR Chest Decub Views Bilateral    Narrative    XR CHEST SPECIALITY VIEWS BILATERAL  11/15/2019 1:41 PM      HISTORY: Eval for Pleural effusion    COMPARISON: Chest radiograph from today    FINDINGS: 2 decubitus views of the chest. Both breasts partially  obscure the lung fields. Cardiac silhouette is within normal limits.  No significant pleural effusion or pneumothorax. Visualized upper  abdomen is unremarkable.      Impression    IMPRESSION: No significant pleural effusion identified.    I have personally reviewed the examination and initial interpretation  and I agree with the findings.    GWENDOLYN SUERO MD   Echo Complete    Narrative    [id number]  [id number]  [id number]  [id number]^RESHMA^NORMAN           [facility],[place]  Echocardiography Laboratory  [address] [place] [id number]     Name: EITAN BECERRIL  MRN: [id number]  : 1975  Study Date: 2019 11:14 AM  Age: 44 yrs  Gender: Female  Patient Location: [facility]  Reason For Study: Cirrhosis  Ordering Physician: NORMAN JUSTICE  Performed By: Edith Jalloh RDCS, RVT     BSA: 2.0 m2  Height: 68 in  Weight: 193 lb  HR: 101  BP: 120/46 mmHg  _____________________________________________________________________________  __        Procedure  Complete Portable Echo Adult.  _____________________________________________________________________________  __        Interpretation Summary  Global and regional left ventricular function is hyperkinetic with an EF >70%.  Right ventricular function, chamber size, wall motion, and thickness  are  normal.  Pulmonary artery systolic pressure cannot be assessed.  The inferior vena cava is normal.  No pericardial effusion is present.  _____________________________________________________________________________  __        Left Ventricle  Global and regional left ventricular function is hyperkinetic with an EF >70%.  Left ventricular wall thickness is normal. Left ventricular size is normal.  Left ventricular diastolic function is normal. No regional wall motion  abnormalities are seen.     Right Ventricle  Right ventricular function, chamber size, wall motion, and thickness are  normal.     Atria  Both atria appear normal.     Mitral Valve  The mitral valve is normal.        Aortic Valve  Aortic valve is normal in structure and function.     Tricuspid Valve  The tricuspid valve is normal. Trace tricuspid insufficiency is present.  Pulmonary artery systolic pressure cannot be assessed.     Pulmonic Valve  The pulmonic valve is normal.     Vessels  The aorta root is normal. The pulmonary artery and bifurcation cannot be  assessed. The inferior vena cava is normal.     Pericardium  No pericardial effusion is present.        Compared to Previous Study  Previous study not available for comparison.  _____________________________________________________________________________  __  MMode/2D Measurements & Calculations     IVSd: 0.94 cm  LVIDd: 4.7 cm  LVIDs: 2.3 cm  LVPWd: 1.0 cm  FS: 51.9 %  LV mass(C)d: 160.2 grams  LV mass(C)dI: 79.6 grams/m2  Ao root diam: 3.2 cm  asc Aorta Diam: 2.8 cm  LVOT diam: 2.1 cm  LVOT area: 3.5 cm2  LA Volume (BP): 62.3 ml  LA Volume Index (BP): 31.0 ml/m2  RWT: 0.43           Doppler Measurements & Calculations  MV E max melania: 76.3 cm/sec  MV A max melania: 88.7 cm/sec  MV E/A: 0.86  MV dec slope: 1210 cm/sec2  PA acc time: 0.11 sec  E/E' av.1  Lateral E/e': 8.0  Medial E/e': 10.3     _____________________________________________________________________________  __           Report  approved by: Alma El 11/13/2019 12:16 PM            Discharge Medications   Discharge Medication List as of 12/2/2019  4:52 PM      START taking these medications    Details   lidocaine 15 mL, alum & mag hydroxide-simethicone 15 mL GI Cocktail Take 30 mLs by mouth daily as needed for moderate pain, 30 mL, Oral, DAILY PRN Starting Mon 12/2/2019, Disp-300 mL, R-1, Local Print      nicotine (COMMIT) 2 MG lozenge Place 1 lozenge (2 mg) inside cheek every hour as needed for smoking cessation, Disp-60 lozenge, R-0, E-Prescribe      ondansetron (ZOFRAN-ODT) 4 MG ODT tab Take 1 tablet (4 mg) by mouth every 6 hours as needed for nausea or vomiting, Disp-30 tablet, R-0, E-Prescribe      oxyCODONE (ROXICODONE) 5 MG tablet Take 1-2 tablets (5-10 mg) by mouth every 4 hours as needed, Disp-40 tablet, R-0, E-Prescribe         CONTINUE these medications which have NOT CHANGED    Details   furosemide (LASIX) 40 MG tablet Take 40 mg by mouth daily, Historical      gabapentin (NEURONTIN) 300 MG capsule Take 1-2 capsules (300-600 mg) by mouth 3 times daily :300 mg by mouth in the morning, 300 mg by mouth in the afternoon, 600 mg by mouth in the evening, Disp-180 capsule, R-1, Local Print      lactulose (CHRONULAC) 10 GM/15ML solution Take 30 mLs by mouth 2 times daily, Disp-946 mL, R-1, E-Prescribe      multivitamin, therapeutic (THERA-VIT) TABS tablet Take 1 tablet by mouth daily, Disp-30 tablet, R-1, E-Prescribe      pantoprazole (PROTONIX) 40 MG EC tablet Take 1 tablet (40 mg) by mouth daily, Disp-60 tablet, R-1, E-Prescribe      rifaximin (XIFAXAN) 550 MG TABS tablet Take 1 tablet (550 mg) by mouth 2 times daily, Disp-100 tablet, R-1, E-Prescribe      spironolactone (ALDACTONE) 100 MG tablet Take 100 mg by mouth daily, Historical         STOP taking these medications       HYDROmorphone (DILAUDID) 2 MG tablet Comments:   Reason for Stopping:             Allergies   Allergies   Allergen Reactions     Codeine Rash

## 2019-12-05 LAB — PETH BLD-MCNC: NEGATIVE NG/ML

## 2019-12-07 LAB
BACTERIA SPEC CULT: NO GROWTH
BACTERIA SPEC CULT: NO GROWTH
SPECIMEN SOURCE: NORMAL
SPECIMEN SOURCE: NORMAL
